# Patient Record
Sex: FEMALE | Race: BLACK OR AFRICAN AMERICAN | Employment: OTHER | URBAN - METROPOLITAN AREA
[De-identification: names, ages, dates, MRNs, and addresses within clinical notes are randomized per-mention and may not be internally consistent; named-entity substitution may affect disease eponyms.]

---

## 2019-09-05 PROBLEM — E66.9 OBESITY (BMI 30-39.9): Status: ACTIVE | Noted: 2019-09-05

## 2019-10-06 PROBLEM — D51.9 ANEMIA DUE TO VITAMIN B12 DEFICIENCY: Status: ACTIVE | Noted: 2019-10-06

## 2019-10-06 PROBLEM — I26.99 PULMONARY EMBOLISM ON RIGHT (HCC): Status: ACTIVE | Noted: 2019-10-06

## 2019-10-06 PROBLEM — D53.9 MACROCYTIC ANEMIA: Status: ACTIVE | Noted: 2019-10-06

## 2019-10-07 PROBLEM — Z00.00 MEDICARE ANNUAL WELLNESS VISIT, SUBSEQUENT: Status: ACTIVE | Noted: 2019-10-07

## 2019-10-07 PROBLEM — Z13.31 SCREENING FOR DEPRESSION: Status: ACTIVE | Noted: 2019-10-07

## 2019-10-07 PROBLEM — Z13.39 SCREENING FOR ALCOHOLISM: Status: ACTIVE | Noted: 2019-10-07

## 2019-10-07 PROBLEM — Z12.11 COLON CANCER SCREENING: Status: ACTIVE | Noted: 2019-10-07

## 2019-10-23 ENCOUNTER — HOSPITAL ENCOUNTER (OUTPATIENT)
Dept: LAB | Age: 74
Discharge: HOME | End: 2019-10-23
Payer: MEDICARE

## 2019-10-23 DIAGNOSIS — D68.59 THROMBOPHILIA (HCC): ICD-10-CM

## 2019-10-23 DIAGNOSIS — I26.99 PULMONARY EMBOLISM ON RIGHT (HCC): ICD-10-CM

## 2019-10-23 LAB
BASOPHILS # BLD: 0.1 K/UL (ref 0–0.2)
BASOPHILS NFR BLD: 1 % (ref 0–2)
D DIMER PPP FEU-MCNC: <0.27 UG/ML(FEU)
DIFFERENTIAL METHOD BLD: ABNORMAL
EOSINOPHIL # BLD: 0.5 K/UL (ref 0–0.8)
EOSINOPHIL NFR BLD: 6 % (ref 0.5–7.8)
ERYTHROCYTE [DISTWIDTH] IN BLOOD BY AUTOMATED COUNT: 14 % (ref 11.9–14.6)
FERRITIN SERPL-MCNC: 207 NG/ML (ref 8–388)
HCT VFR BLD AUTO: 38.9 % (ref 35.8–46.3)
HGB BLD-MCNC: 12.5 G/DL (ref 11.7–15.4)
HGB RETIC QN AUTO: 34 PG (ref 29–35)
IMM GRANULOCYTES # BLD AUTO: 0 K/UL (ref 0–0.5)
IMM GRANULOCYTES NFR BLD AUTO: 0 % (ref 0–5)
IMM RETICS NFR: 7.3 % (ref 3–15.9)
IRON SATN MFR SERPL: 19 %
IRON SERPL-MCNC: 47 UG/DL (ref 35–150)
LYMPHOCYTES # BLD: 3.3 K/UL (ref 0.5–4.6)
LYMPHOCYTES NFR BLD: 38 % (ref 13–44)
MCH RBC QN AUTO: 33.6 PG (ref 26.1–32.9)
MCHC RBC AUTO-ENTMCNC: 32.1 G/DL (ref 31.4–35)
MCV RBC AUTO: 104.6 FL (ref 79.6–97.8)
MONOCYTES # BLD: 0.5 K/UL (ref 0.1–1.3)
MONOCYTES NFR BLD: 6 % (ref 4–12)
NEUTS SEG # BLD: 4.4 K/UL (ref 1.7–8.2)
NEUTS SEG NFR BLD: 49 % (ref 43–78)
NRBC # BLD: 0 K/UL (ref 0–0.2)
PLATELET # BLD AUTO: 214 K/UL (ref 150–450)
PLATELET COMMENTS,PCOM: ADEQUATE
PMV BLD AUTO: 10.8 FL (ref 9.4–12.3)
RBC # BLD AUTO: 3.72 M/UL (ref 4.05–5.25)
RBC MORPH BLD: ABNORMAL
RBC MORPH BLD: ABNORMAL
RETICS # AUTO: 0.06 M/UL (ref 0.03–0.1)
RETICS/RBC NFR AUTO: 1.5 % (ref 0.3–2)
TIBC SERPL-MCNC: 249 UG/DL (ref 250–450)
WBC # BLD AUTO: 8.8 K/UL (ref 4.3–11.1)
WBC MORPH BLD: ABNORMAL

## 2019-10-23 PROCEDURE — 86146 BETA-2 GLYCOPROTEIN ANTIBODY: CPT

## 2019-10-23 PROCEDURE — 85025 COMPLETE CBC W/AUTO DIFF WBC: CPT

## 2019-10-23 PROCEDURE — 86147 CARDIOLIPIN ANTIBODY EA IG: CPT

## 2019-10-23 PROCEDURE — 83540 ASSAY OF IRON: CPT

## 2019-10-23 PROCEDURE — 82728 ASSAY OF FERRITIN: CPT

## 2019-10-23 PROCEDURE — 36415 COLL VENOUS BLD VENIPUNCTURE: CPT

## 2019-10-23 PROCEDURE — 85046 RETICYTE/HGB CONCENTRATE: CPT

## 2019-10-23 PROCEDURE — 85379 FIBRIN DEGRADATION QUANT: CPT

## 2019-10-24 LAB
B2 GLYCOPROT1 IGA SER-ACNC: <9 GPI IGA UNITS (ref 0–25)
B2 GLYCOPROT1 IGG SER-ACNC: <9 GPI IGG UNITS (ref 0–20)
B2 GLYCOPROT1 IGM SER-ACNC: <9 GPI IGM UNITS (ref 0–32)
CARDIOLIPIN IGA SER IA-ACNC: <9 APL U/ML (ref 0–11)
CARDIOLIPIN IGG SER IA-ACNC: <9 GPL U/ML (ref 0–14)
CARDIOLIPIN IGM SER IA-ACNC: <9 MPL U/ML (ref 0–12)

## 2019-10-30 LAB — PROTHROMBIN G2021A MUTATION, XMPTMT: NORMAL

## 2019-11-06 PROBLEM — Z12.11 COLON CANCER SCREENING: Status: RESOLVED | Noted: 2019-10-07 | Resolved: 2019-11-06

## 2019-11-06 PROBLEM — Z00.00 MEDICARE ANNUAL WELLNESS VISIT, SUBSEQUENT: Status: RESOLVED | Noted: 2019-10-07 | Resolved: 2019-11-06

## 2019-11-26 ENCOUNTER — HOSPITAL ENCOUNTER (OUTPATIENT)
Dept: ULTRASOUND IMAGING | Age: 74
Discharge: HOME | End: 2019-11-26
Attending: INTERNAL MEDICINE

## 2019-11-26 DIAGNOSIS — D68.59 THROMBOPHILIA (HCC): ICD-10-CM

## 2019-12-04 ENCOUNTER — HOSPITAL ENCOUNTER (OUTPATIENT)
Dept: LAB | Age: 74
Discharge: HOME | End: 2019-12-04
Payer: MEDICARE

## 2019-12-04 DIAGNOSIS — D68.59 THROMBOPHILIA (HCC): ICD-10-CM

## 2019-12-04 LAB
ALBUMIN SERPL-MCNC: 3.2 G/DL (ref 3.2–4.6)
ALBUMIN/GLOB SERPL: 0.7 {RATIO} (ref 1.2–3.5)
ALP SERPL-CCNC: 136 U/L (ref 50–136)
ALT SERPL-CCNC: 27 U/L (ref 12–65)
ANION GAP SERPL CALC-SCNC: 6 MMOL/L (ref 7–16)
AST SERPL-CCNC: 14 U/L (ref 15–37)
BASOPHILS # BLD: 0 K/UL (ref 0–0.2)
BASOPHILS NFR BLD: 0 % (ref 0–2)
BILIRUB SERPL-MCNC: 0.2 MG/DL (ref 0.2–1.1)
BUN SERPL-MCNC: 14 MG/DL (ref 8–23)
CALCIUM SERPL-MCNC: 8.7 MG/DL (ref 8.3–10.4)
CHLORIDE SERPL-SCNC: 109 MMOL/L (ref 98–107)
CO2 SERPL-SCNC: 28 MMOL/L (ref 21–32)
CREAT SERPL-MCNC: 0.88 MG/DL (ref 0.6–1)
D DIMER PPP FEU-MCNC: 0.49 UG/ML(FEU)
DIFFERENTIAL METHOD BLD: ABNORMAL
EOSINOPHIL # BLD: 0.3 K/UL (ref 0–0.8)
EOSINOPHIL NFR BLD: 4 % (ref 0.5–7.8)
ERYTHROCYTE [DISTWIDTH] IN BLOOD BY AUTOMATED COUNT: 13.2 % (ref 11.9–14.6)
GLOBULIN SER CALC-MCNC: 4.3 G/DL (ref 2.3–3.5)
GLUCOSE SERPL-MCNC: 82 MG/DL (ref 65–100)
HCT VFR BLD AUTO: 38.5 % (ref 35.8–46.3)
HGB BLD-MCNC: 12.3 G/DL (ref 11.7–15.4)
IMM GRANULOCYTES # BLD AUTO: 0 K/UL (ref 0–0.5)
IMM GRANULOCYTES NFR BLD AUTO: 0 % (ref 0–5)
LYMPHOCYTES # BLD: 3.2 K/UL (ref 0.5–4.6)
LYMPHOCYTES NFR BLD: 45 % (ref 13–44)
MCH RBC QN AUTO: 30.4 PG (ref 26.1–32.9)
MCHC RBC AUTO-ENTMCNC: 31.9 G/DL (ref 31.4–35)
MCV RBC AUTO: 95.3 FL (ref 79.6–97.8)
MONOCYTES # BLD: 0.5 K/UL (ref 0.1–1.3)
MONOCYTES NFR BLD: 7 % (ref 4–12)
NEUTS SEG # BLD: 3.1 K/UL (ref 1.7–8.2)
NEUTS SEG NFR BLD: 44 % (ref 43–78)
NRBC # BLD: 0 K/UL (ref 0–0.2)
PLATELET # BLD AUTO: 230 K/UL (ref 150–450)
PMV BLD AUTO: 10.2 FL (ref 9.4–12.3)
POTASSIUM SERPL-SCNC: 3.6 MMOL/L (ref 3.5–5.1)
PROT SERPL-MCNC: 7.5 G/DL (ref 6.3–8.2)
RBC # BLD AUTO: 4.04 M/UL (ref 4.05–5.25)
SODIUM SERPL-SCNC: 143 MMOL/L (ref 136–145)
WBC # BLD AUTO: 7.1 K/UL (ref 4.3–11.1)

## 2019-12-04 PROCEDURE — 85025 COMPLETE CBC W/AUTO DIFF WBC: CPT

## 2019-12-04 PROCEDURE — 36415 COLL VENOUS BLD VENIPUNCTURE: CPT

## 2019-12-04 PROCEDURE — 85306 CLOT INHIBIT PROT S FREE: CPT

## 2019-12-04 PROCEDURE — 85300 ANTITHROMBIN III ACTIVITY: CPT

## 2019-12-04 PROCEDURE — 85303 CLOT INHIBIT PROT C ACTIVITY: CPT

## 2019-12-04 PROCEDURE — 85301 ANTITHROMBIN III ANTIGEN: CPT

## 2019-12-04 PROCEDURE — 80053 COMPREHEN METABOLIC PANEL: CPT

## 2019-12-04 PROCEDURE — 85610 PROTHROMBIN TIME: CPT

## 2019-12-04 PROCEDURE — 85379 FIBRIN DEGRADATION QUANT: CPT

## 2019-12-05 LAB
AT III ACT/NOR PPP CHRO: NORMAL %
AT III AG ACT/NOR PPP IA: NORMAL %
PROT C ACT/NOR PPP CHRO: NORMAL %

## 2019-12-06 LAB — LUPUS ANTICOAG PANEL, XMLUPT: NORMAL

## 2019-12-07 LAB
FACTOR V LEIDEN MUTATION, XMF5LT: NORMAL %
PROT S ACT/NOR PPP CHRO: NORMAL %

## 2020-06-08 ENCOUNTER — HOSPITAL ENCOUNTER (OUTPATIENT)
Dept: LAB | Age: 75
Discharge: HOME | End: 2020-06-08
Payer: MEDICARE

## 2020-06-08 DIAGNOSIS — D68.59 THROMBOPHILIA (HCC): ICD-10-CM

## 2020-06-08 LAB
ALBUMIN SERPL-MCNC: 3.5 G/DL (ref 3.2–4.6)
ALBUMIN/GLOB SERPL: 0.8 {RATIO} (ref 1.2–3.5)
ALP SERPL-CCNC: 165 U/L (ref 50–136)
ALT SERPL-CCNC: 32 U/L (ref 12–65)
ANION GAP SERPL CALC-SCNC: 4 MMOL/L (ref 7–16)
AST SERPL-CCNC: 21 U/L (ref 15–37)
BASOPHILS # BLD: 0 K/UL (ref 0–0.2)
BASOPHILS NFR BLD: 0 % (ref 0–2)
BILIRUB SERPL-MCNC: 0.4 MG/DL (ref 0.2–1.1)
BUN SERPL-MCNC: 14 MG/DL (ref 8–23)
CALCIUM SERPL-MCNC: 8.5 MG/DL (ref 8.3–10.4)
CHLORIDE SERPL-SCNC: 110 MMOL/L (ref 98–107)
CO2 SERPL-SCNC: 26 MMOL/L (ref 21–32)
CREAT SERPL-MCNC: 1 MG/DL (ref 0.6–1)
D DIMER PPP FEU-MCNC: <0.27 UG/ML(FEU)
DIFFERENTIAL METHOD BLD: NORMAL
EOSINOPHIL # BLD: 0.3 K/UL (ref 0–0.8)
EOSINOPHIL NFR BLD: 4 % (ref 0.5–7.8)
ERYTHROCYTE [DISTWIDTH] IN BLOOD BY AUTOMATED COUNT: 13.5 % (ref 11.9–14.6)
GLOBULIN SER CALC-MCNC: 4.3 G/DL (ref 2.3–3.5)
GLUCOSE SERPL-MCNC: 112 MG/DL (ref 65–100)
HCT VFR BLD AUTO: 37.9 % (ref 35.8–46.3)
HGB BLD-MCNC: 12.8 G/DL (ref 11.7–15.4)
IMM GRANULOCYTES # BLD AUTO: 0 K/UL (ref 0–0.5)
IMM GRANULOCYTES NFR BLD AUTO: 0 % (ref 0–5)
LYMPHOCYTES # BLD: 3 K/UL (ref 0.5–4.6)
LYMPHOCYTES NFR BLD: 35 % (ref 13–44)
MCH RBC QN AUTO: 29.2 PG (ref 26.1–32.9)
MCHC RBC AUTO-ENTMCNC: 33.8 G/DL (ref 31.4–35)
MCV RBC AUTO: 86.5 FL (ref 79.6–97.8)
MONOCYTES # BLD: 0.7 K/UL (ref 0.1–1.3)
MONOCYTES NFR BLD: 8 % (ref 4–12)
NEUTS SEG # BLD: 4.5 K/UL (ref 1.7–8.2)
NEUTS SEG NFR BLD: 53 % (ref 43–78)
NRBC # BLD: 0 K/UL (ref 0–0.2)
PLATELET # BLD AUTO: 222 K/UL (ref 150–450)
PMV BLD AUTO: 10 FL (ref 9.4–12.3)
POTASSIUM SERPL-SCNC: 3.7 MMOL/L (ref 3.5–5.1)
PROT SERPL-MCNC: 7.8 G/DL (ref 6.3–8.2)
RBC # BLD AUTO: 4.38 M/UL (ref 4.05–5.25)
SODIUM SERPL-SCNC: 140 MMOL/L (ref 136–145)
WBC # BLD AUTO: 8.5 K/UL (ref 4.3–11.1)

## 2020-06-08 PROCEDURE — 85025 COMPLETE CBC W/AUTO DIFF WBC: CPT

## 2020-06-08 PROCEDURE — 85379 FIBRIN DEGRADATION QUANT: CPT

## 2020-06-08 PROCEDURE — 80053 COMPREHEN METABOLIC PANEL: CPT

## 2020-06-08 PROCEDURE — 36415 COLL VENOUS BLD VENIPUNCTURE: CPT

## 2020-08-28 PROBLEM — R21 RASH AND NONSPECIFIC SKIN ERUPTION: Status: ACTIVE | Noted: 2020-08-28

## 2020-08-28 PROBLEM — E66.01 SEVERE OBESITY (HCC): Status: ACTIVE | Noted: 2020-08-28

## 2021-03-02 PROBLEM — Z23 ENCOUNTER FOR IMMUNIZATION: Status: ACTIVE | Noted: 2021-03-02

## 2021-03-23 ENCOUNTER — HOSPITAL ENCOUNTER (OUTPATIENT)
Dept: LAB | Age: 76
Discharge: HOME | End: 2021-03-23
Payer: MEDICARE

## 2021-03-23 DIAGNOSIS — I26.99 PULMONARY EMBOLISM ON RIGHT (HCC): ICD-10-CM

## 2021-03-23 LAB
ALBUMIN SERPL-MCNC: 3.7 G/DL (ref 3.2–4.6)
ALBUMIN/GLOB SERPL: 0.9 {RATIO} (ref 1.2–3.5)
ALP SERPL-CCNC: 150 U/L (ref 50–136)
ALT SERPL-CCNC: 26 U/L (ref 12–65)
ANION GAP SERPL CALC-SCNC: 4 MMOL/L (ref 7–16)
AST SERPL-CCNC: 14 U/L (ref 15–37)
BASOPHILS # BLD: 0 K/UL (ref 0–0.2)
BASOPHILS NFR BLD: 1 % (ref 0–2)
BILIRUB SERPL-MCNC: 0.4 MG/DL (ref 0.2–1.1)
BUN SERPL-MCNC: 20 MG/DL (ref 8–23)
CALCIUM SERPL-MCNC: 9.2 MG/DL (ref 8.3–10.4)
CHLORIDE SERPL-SCNC: 108 MMOL/L (ref 98–107)
CO2 SERPL-SCNC: 29 MMOL/L (ref 21–32)
CREAT SERPL-MCNC: 0.9 MG/DL (ref 0.6–1)
D DIMER PPP FEU-MCNC: <0.27 UG/ML(FEU)
DIFFERENTIAL METHOD BLD: NORMAL
EOSINOPHIL # BLD: 0.3 K/UL (ref 0–0.8)
EOSINOPHIL NFR BLD: 4 % (ref 0.5–7.8)
ERYTHROCYTE [DISTWIDTH] IN BLOOD BY AUTOMATED COUNT: 14.1 % (ref 11.9–14.6)
GLOBULIN SER CALC-MCNC: 4.3 G/DL (ref 2.3–3.5)
GLUCOSE SERPL-MCNC: 91 MG/DL (ref 65–100)
HCT VFR BLD AUTO: 40.8 % (ref 35.8–46.3)
HGB BLD-MCNC: 13.1 G/DL (ref 11.7–15.4)
IMM GRANULOCYTES # BLD AUTO: 0 K/UL (ref 0–0.5)
IMM GRANULOCYTES NFR BLD AUTO: 0 % (ref 0–5)
LYMPHOCYTES # BLD: 3.5 K/UL (ref 0.5–4.6)
LYMPHOCYTES NFR BLD: 42 % (ref 13–44)
MCH RBC QN AUTO: 28.5 PG (ref 26.1–32.9)
MCHC RBC AUTO-ENTMCNC: 32.1 G/DL (ref 31.4–35)
MCV RBC AUTO: 88.9 FL (ref 79.6–97.8)
MONOCYTES # BLD: 0.6 K/UL (ref 0.1–1.3)
MONOCYTES NFR BLD: 8 % (ref 4–12)
NEUTS SEG # BLD: 3.7 K/UL (ref 1.7–8.2)
NEUTS SEG NFR BLD: 45 % (ref 43–78)
NRBC # BLD: 0 K/UL (ref 0–0.2)
PLATELET # BLD AUTO: 237 K/UL (ref 150–450)
PMV BLD AUTO: 10 FL (ref 9.4–12.3)
POTASSIUM SERPL-SCNC: 4.8 MMOL/L (ref 3.5–5.1)
PROT SERPL-MCNC: 8 G/DL (ref 6.3–8.2)
RBC # BLD AUTO: 4.59 M/UL (ref 4.05–5.2)
SODIUM SERPL-SCNC: 141 MMOL/L (ref 136–145)
WBC # BLD AUTO: 8.2 K/UL (ref 4.3–11.1)

## 2021-03-23 PROCEDURE — 80053 COMPREHEN METABOLIC PANEL: CPT

## 2021-03-23 PROCEDURE — 85379 FIBRIN DEGRADATION QUANT: CPT

## 2021-03-23 PROCEDURE — 85025 COMPLETE CBC W/AUTO DIFF WBC: CPT

## 2021-03-23 PROCEDURE — 36415 COLL VENOUS BLD VENIPUNCTURE: CPT

## 2021-04-01 PROBLEM — Z00.00 MEDICARE ANNUAL WELLNESS VISIT, SUBSEQUENT: Status: RESOLVED | Noted: 2019-10-07 | Resolved: 2021-04-01

## 2021-04-01 PROBLEM — Z23 ENCOUNTER FOR IMMUNIZATION: Status: RESOLVED | Noted: 2021-03-02 | Resolved: 2021-04-01

## 2021-09-02 PROBLEM — D68.59 THROMBOPHILIA (HCC): Status: ACTIVE | Noted: 2021-09-02

## 2021-09-29 ENCOUNTER — HOSPITAL ENCOUNTER (OUTPATIENT)
Dept: LAB | Age: 76
Discharge: HOME | End: 2021-09-29
Payer: MEDICARE

## 2021-09-29 DIAGNOSIS — D68.59 THROMBOPHILIA (HCC): ICD-10-CM

## 2021-09-29 LAB
ALBUMIN SERPL-MCNC: 3.4 G/DL (ref 3.2–4.6)
ALBUMIN/GLOB SERPL: 0.8 {RATIO} (ref 1.2–3.5)
ALP SERPL-CCNC: 163 U/L (ref 50–136)
ALT SERPL-CCNC: 25 U/L (ref 12–65)
ANION GAP SERPL CALC-SCNC: 6 MMOL/L (ref 7–16)
AST SERPL-CCNC: 14 U/L (ref 15–37)
BASOPHILS # BLD: 0 K/UL (ref 0–0.2)
BASOPHILS NFR BLD: 1 % (ref 0–2)
BILIRUB SERPL-MCNC: 0.5 MG/DL (ref 0.2–1.1)
BUN SERPL-MCNC: 19 MG/DL (ref 8–23)
CALCIUM SERPL-MCNC: 9.5 MG/DL (ref 8.3–10.4)
CHLORIDE SERPL-SCNC: 110 MMOL/L (ref 98–107)
CO2 SERPL-SCNC: 24 MMOL/L (ref 21–32)
CREAT SERPL-MCNC: 0.97 MG/DL (ref 0.6–1)
D DIMER PPP FEU-MCNC: <0.27 UG/ML(FEU)
DIFFERENTIAL METHOD BLD: NORMAL
EOSINOPHIL # BLD: 0.3 K/UL (ref 0–0.8)
EOSINOPHIL NFR BLD: 4 % (ref 0.5–7.8)
ERYTHROCYTE [DISTWIDTH] IN BLOOD BY AUTOMATED COUNT: 14.6 % (ref 11.9–14.6)
GLOBULIN SER CALC-MCNC: 4.5 G/DL (ref 2.3–3.5)
GLUCOSE SERPL-MCNC: 131 MG/DL (ref 65–100)
HCT VFR BLD AUTO: 41.5 % (ref 35.8–46.3)
HGB BLD-MCNC: 13.6 G/DL (ref 11.7–15.4)
IMM GRANULOCYTES # BLD AUTO: 0 K/UL (ref 0–0.5)
IMM GRANULOCYTES NFR BLD AUTO: 0 % (ref 0–5)
LYMPHOCYTES # BLD: 2.5 K/UL (ref 0.5–4.6)
LYMPHOCYTES NFR BLD: 33 % (ref 13–44)
MCH RBC QN AUTO: 29.1 PG (ref 26.1–32.9)
MCHC RBC AUTO-ENTMCNC: 32.8 G/DL (ref 31.4–35)
MCV RBC AUTO: 88.7 FL (ref 79.6–97.8)
MONOCYTES # BLD: 0.8 K/UL (ref 0.1–1.3)
MONOCYTES NFR BLD: 10 % (ref 4–12)
NEUTS SEG # BLD: 3.9 K/UL (ref 1.7–8.2)
NEUTS SEG NFR BLD: 52 % (ref 43–78)
NRBC # BLD: 0 K/UL (ref 0–0.2)
PLATELET # BLD AUTO: 248 K/UL (ref 150–450)
PMV BLD AUTO: 10.5 FL (ref 9.4–12.3)
POTASSIUM SERPL-SCNC: 3.9 MMOL/L (ref 3.5–5.1)
PROT SERPL-MCNC: 7.9 G/DL (ref 6.3–8.2)
RBC # BLD AUTO: 4.68 M/UL (ref 4.05–5.2)
SODIUM SERPL-SCNC: 140 MMOL/L (ref 136–145)
WBC # BLD AUTO: 7.6 K/UL (ref 4.3–11.1)

## 2021-09-29 PROCEDURE — 36415 COLL VENOUS BLD VENIPUNCTURE: CPT

## 2021-09-29 PROCEDURE — 80053 COMPREHEN METABOLIC PANEL: CPT

## 2021-09-29 PROCEDURE — 85379 FIBRIN DEGRADATION QUANT: CPT

## 2021-09-29 PROCEDURE — 85025 COMPLETE CBC W/AUTO DIFF WBC: CPT

## 2022-01-02 PROBLEM — H93.A3 PULSATILE TINNITUS OF BOTH EARS: Status: ACTIVE | Noted: 2022-01-02

## 2022-03-18 PROBLEM — E66.01 SEVERE OBESITY (HCC): Status: ACTIVE | Noted: 2020-08-28

## 2022-03-19 PROBLEM — Z13.31 SCREENING FOR DEPRESSION: Status: ACTIVE | Noted: 2019-10-07

## 2022-03-19 PROBLEM — D68.59 THROMBOPHILIA (HCC): Status: ACTIVE | Noted: 2021-09-02

## 2022-03-19 PROBLEM — D53.9 MACROCYTIC ANEMIA: Status: ACTIVE | Noted: 2019-10-06

## 2022-03-19 PROBLEM — Z13.39 SCREENING FOR ALCOHOLISM: Status: ACTIVE | Noted: 2019-10-07

## 2022-03-19 PROBLEM — H93.A3 PULSATILE TINNITUS OF BOTH EARS: Status: ACTIVE | Noted: 2022-01-02

## 2022-03-19 PROBLEM — I26.99 PULMONARY EMBOLISM ON RIGHT (HCC): Status: ACTIVE | Noted: 2019-10-06

## 2022-03-19 PROBLEM — R21 RASH AND NONSPECIFIC SKIN ERUPTION: Status: ACTIVE | Noted: 2020-08-28

## 2022-03-19 PROBLEM — D51.9 ANEMIA DUE TO VITAMIN B12 DEFICIENCY: Status: ACTIVE | Noted: 2019-10-06

## 2022-03-20 PROBLEM — E66.9 OBESITY (BMI 30-39.9): Status: ACTIVE | Noted: 2019-09-05

## 2022-03-30 ENCOUNTER — HOSPITAL ENCOUNTER (OUTPATIENT)
Dept: LAB | Age: 77
Discharge: HOME | End: 2022-03-30
Payer: MEDICARE

## 2022-03-30 DIAGNOSIS — D68.59 THROMBOPHILIA (HCC): ICD-10-CM

## 2022-03-30 LAB
ALBUMIN SERPL-MCNC: 3.5 G/DL (ref 3.2–4.6)
ALBUMIN/GLOB SERPL: 0.8 {RATIO} (ref 1.2–3.5)
ALP SERPL-CCNC: 165 U/L (ref 50–136)
ALT SERPL-CCNC: 22 U/L (ref 12–65)
ANION GAP SERPL CALC-SCNC: 3 MMOL/L (ref 7–16)
AST SERPL-CCNC: 13 U/L (ref 15–37)
BASOPHILS # BLD: 0 K/UL (ref 0–0.2)
BASOPHILS NFR BLD: 0 % (ref 0–2)
BILIRUB SERPL-MCNC: 0.5 MG/DL (ref 0.2–1.1)
BUN SERPL-MCNC: 21 MG/DL (ref 8–23)
CALCIUM SERPL-MCNC: 8.7 MG/DL (ref 8.3–10.4)
CHLORIDE SERPL-SCNC: 112 MMOL/L (ref 98–107)
CO2 SERPL-SCNC: 26 MMOL/L (ref 21–32)
CREAT SERPL-MCNC: 1 MG/DL (ref 0.6–1)
D DIMER PPP FEU-MCNC: <0.27 UG/ML(FEU)
DIFFERENTIAL METHOD BLD: NORMAL
EOSINOPHIL # BLD: 0.5 K/UL (ref 0–0.8)
EOSINOPHIL NFR BLD: 6 % (ref 0.5–7.8)
ERYTHROCYTE [DISTWIDTH] IN BLOOD BY AUTOMATED COUNT: 14.4 % (ref 11.9–14.6)
GLOBULIN SER CALC-MCNC: 4.4 G/DL (ref 2.3–3.5)
GLUCOSE SERPL-MCNC: 114 MG/DL (ref 65–100)
HCT VFR BLD AUTO: 38.7 % (ref 35.8–46.3)
HGB BLD-MCNC: 12.7 G/DL (ref 11.7–15.4)
IMM GRANULOCYTES # BLD AUTO: 0 K/UL (ref 0–0.5)
IMM GRANULOCYTES NFR BLD AUTO: 0 % (ref 0–5)
LYMPHOCYTES # BLD: 2.6 K/UL (ref 0.5–4.6)
LYMPHOCYTES NFR BLD: 35 % (ref 13–44)
MCH RBC QN AUTO: 29.6 PG (ref 26.1–32.9)
MCHC RBC AUTO-ENTMCNC: 32.8 G/DL (ref 31.4–35)
MCV RBC AUTO: 90.2 FL (ref 79.6–97.8)
MONOCYTES # BLD: 0.5 K/UL (ref 0.1–1.3)
MONOCYTES NFR BLD: 7 % (ref 4–12)
NEUTS SEG # BLD: 4 K/UL (ref 1.7–8.2)
NEUTS SEG NFR BLD: 52 % (ref 43–78)
NRBC # BLD: 0 K/UL (ref 0–0.2)
PLATELET # BLD AUTO: 207 K/UL (ref 150–450)
PMV BLD AUTO: 10.3 FL (ref 9.4–12.3)
POTASSIUM SERPL-SCNC: 3.8 MMOL/L (ref 3.5–5.1)
PROT SERPL-MCNC: 7.9 G/DL (ref 6.3–8.2)
RBC # BLD AUTO: 4.29 M/UL (ref 4.05–5.2)
SODIUM SERPL-SCNC: 141 MMOL/L (ref 136–145)
WBC # BLD AUTO: 7.6 K/UL (ref 4.3–11.1)

## 2022-03-30 PROCEDURE — 85025 COMPLETE CBC W/AUTO DIFF WBC: CPT

## 2022-03-30 PROCEDURE — 80053 COMPREHEN METABOLIC PANEL: CPT

## 2022-03-30 PROCEDURE — 36415 COLL VENOUS BLD VENIPUNCTURE: CPT

## 2022-03-30 PROCEDURE — 85379 FIBRIN DEGRADATION QUANT: CPT

## 2022-07-25 ENCOUNTER — OFFICE VISIT (OUTPATIENT)
Dept: CARDIOLOGY CLINIC | Age: 77
End: 2022-07-25
Payer: MEDICAID

## 2022-07-25 VITALS
BODY MASS INDEX: 31.45 KG/M2 | DIASTOLIC BLOOD PRESSURE: 68 MMHG | HEART RATE: 60 BPM | WEIGHT: 156 LBS | SYSTOLIC BLOOD PRESSURE: 130 MMHG | HEIGHT: 59 IN

## 2022-07-25 DIAGNOSIS — I26.99 PULMONARY EMBOLISM ON RIGHT (HCC): ICD-10-CM

## 2022-07-25 DIAGNOSIS — I10 ESSENTIAL HYPERTENSION: Primary | ICD-10-CM

## 2022-07-25 DIAGNOSIS — Z79.01 CHRONIC ANTICOAGULATION: ICD-10-CM

## 2022-07-25 PROCEDURE — 99212 OFFICE O/P EST SF 10 MIN: CPT | Performed by: INTERNAL MEDICINE

## 2022-07-25 PROCEDURE — 1123F ACP DISCUSS/DSCN MKR DOCD: CPT | Performed by: INTERNAL MEDICINE

## 2022-07-25 ASSESSMENT — ENCOUNTER SYMPTOMS: SHORTNESS OF BREATH: 0

## 2022-07-25 NOTE — PROGRESS NOTES
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 female seen for a follow up visit regarding the following:     Chief Complaint   Patient presents with    Follow-up Chronic Condition    Hypertension            HPI:  Follow up  Follow-up Chronic Condition and Hypertension   . Follow up hypertension. Feels great, tinnitus resolved with BP control, tolerating anticoagulant. Juancarlos Elmore PAST CARDIAC HISTORY:   2019- RLL pulmonary embolus- unprovoked - anticoagulated   Dec 2021- < 50% bilateral carotid artery stenosis          Past Medical History, Past Surgical History, Family history, Social History, and Medications were all reviewed with the patient today and updated as necessary. Prior to Admission medications    Medication Sig Start Date End Date Taking?  Authorizing Provider   Multiple Vitamin (MULTIVITAMIN ADULT PO) Take 1 tablet by mouth daily   Yes Historical Provider, MD   rivaroxaban (XARELTO) 20 MG TABS tablet Take 1 tablet by mouth daily (with breakfast) 22  Yes Gary Lizarraga MD   atorvastatin (LIPITOR) 20 MG tablet Take 20 mg by mouth daily 22  Yes Ar Automatic Reconciliation   carvedilol (COREG) 25 MG tablet Take 25 mg by mouth 2 times daily (with meals) 22  Yes Ar Automatic Reconciliation   hydrOXYzine (ATARAX) 25 MG tablet TAKE 1 TAB BY MOUTH THREE (3) TIMES DAILY AS NEEDED FOR ITCHING 22  Yes Ar Automatic Reconciliation   valsartan (DIOVAN) 320 MG tablet Take 320 mg by mouth daily 21  Yes Ar Automatic Reconciliation   cyanocobalamin 500 MCG tablet Take 500 mcg by mouth daily  Patient not taking: Reported on 2022    Ar Automatic Reconciliation     Allergies   Allergen Reactions    Codeine Swelling     Swelling of face and lips     Past Medical History:   Diagnosis Date    AR (allergic rhinitis)     Elevated LDL cholesterol level     H/O iron deficiency anemia     Hypertension     well controlled    Mild hyperlipidemia     Pulmonary emboli (HCC)     Pulmonary embolus (HCC)      Past Surgical History:   Procedure Laterality Date    CHOLECYSTECTOMY       Family History   Problem Relation Age of Onset    Stroke Father 48     Social History     Tobacco Use    Smoking status: Never    Smokeless tobacco: Never   Substance Use Topics    Alcohol use: No     Alcohol/week: 0.0 standard drinks       ROS:    Review of Systems   Cardiovascular:  Negative for chest pain. Respiratory:  Negative for shortness of breath. PHYSICAL EXAM:   /68   Pulse 60   Ht 4' 11\" (1.499 m)   Wt 156 lb (70.8 kg)   BMI 31.51 kg/m²      Wt Readings from Last 3 Encounters:   07/25/22 156 lb (70.8 kg)   03/30/22 157 lb 12.8 oz (71.6 kg)   02/22/22 159 lb (72.1 kg)     BP Readings from Last 3 Encounters:   07/25/22 130/68   03/30/22 132/71   02/22/22 122/80     Pulse Readings from Last 3 Encounters:   07/25/22 60   03/30/22 66   02/22/22 67           Physical Exam  Constitutional:       Appearance: Normal appearance. HENT:      Head: Normocephalic and atraumatic. Eyes:      Conjunctiva/sclera: Conjunctivae normal.   Neck:      Vascular: No carotid bruit. Cardiovascular:      Rate and Rhythm: Normal rate and regular rhythm. Heart sounds: No murmur heard. No friction rub. No gallop. Pulmonary:      Effort: No respiratory distress. Breath sounds: No wheezing or rales. Musculoskeletal:         General: No swelling. Cervical back: Neck supple. Skin:     General: Skin is warm and dry. Neurological:      General: No focal deficit present. Mental Status: She is alert. Psychiatric:         Mood and Affect: Mood normal.         Behavior: Behavior normal.       Medical problems and test results were reviewed with the patient today.      DATA REVIEW    LIPID PANEL     Lab Results Component Value Date    CHOL 110 02/25/2022    CHOL 144 03/02/2021    CHOL 132 08/28/2020     Lab Results   Component Value Date    TRIG 65 02/25/2022    TRIG 74 03/02/2021    TRIG 74 08/28/2020     Lab Results   Component Value Date    HDL 41 02/25/2022    HDL 51 03/02/2021    HDL 53 08/28/2020     Lab Results   Component Value Date    LDLCALC 55 02/25/2022    LDLCALC 78 03/02/2021    LDLCALC 64 08/28/2020     Lab Results   Component Value Date    LABVLDL 15 08/28/2020    LABVLDL 20 09/05/2019    VLDL 14 02/25/2022    VLDL 15 03/02/2021     No results found for: CHOLHDLRATIO    BMP  Lab Results   Component Value Date/Time     03/30/2022 11:05 AM    K 3.8 03/30/2022 11:05 AM     03/30/2022 11:05 AM    CO2 26 03/30/2022 11:05 AM    BUN 21 03/30/2022 11:05 AM    CREATININE 1.00 03/30/2022 11:05 AM    GLUCOSE 114 03/30/2022 11:05 AM    CALCIUM 8.7 03/30/2022 11:05 AM      Lab Results   Component Value Date    WBC 7.6 03/30/2022    HGB 12.7 03/30/2022    HCT 38.7 03/30/2022    MCV 90.2 03/30/2022     03/30/2022       EKG        CXR/IMAGING        DEVICE INTERROGATION        OUTSIDE RECORDS REVIEW    Records from outside providers have been reviewed and summarized as noted in the HPI, past history and data review sections of this note, and reviewed with patient. .       ASSESSMENT and PLAN    Marzella Kocher was seen today for follow-up chronic condition and hypertension. Diagnoses and all orders for this visit:    Essential hypertension    Pulmonary embolism on right (HCC)    Chronic anticoagulation        IMPRESSION:    BP at target, continue meds      Tinnitus resolved        Return if symptoms worsen or fail to improve. Thank you for allowing me to participate in this patient's care. Please call or contact me if there are any questions or concerns regarding the above.       Rah Pate MD  07/25/22  12:04 PM

## 2022-08-22 ENCOUNTER — OFFICE VISIT (OUTPATIENT)
Dept: FAMILY MEDICINE CLINIC | Facility: CLINIC | Age: 77
End: 2022-08-22
Payer: MEDICAID

## 2022-08-22 VITALS
HEIGHT: 58 IN | OXYGEN SATURATION: 97 % | DIASTOLIC BLOOD PRESSURE: 74 MMHG | HEART RATE: 73 BPM | BODY MASS INDEX: 32.95 KG/M2 | SYSTOLIC BLOOD PRESSURE: 130 MMHG | TEMPERATURE: 97.1 F | RESPIRATION RATE: 16 BRPM | WEIGHT: 157 LBS

## 2022-08-22 DIAGNOSIS — I10 ESSENTIAL HYPERTENSION: ICD-10-CM

## 2022-08-22 DIAGNOSIS — E78.2 MIXED HYPERLIPIDEMIA: Primary | ICD-10-CM

## 2022-08-22 DIAGNOSIS — E66.9 OBESITY (BMI 30-39.9): ICD-10-CM

## 2022-08-22 PROBLEM — N18.30 CHRONIC RENAL DISEASE, STAGE III (HCC): Status: ACTIVE | Noted: 2022-08-22

## 2022-08-22 PROCEDURE — 99214 OFFICE O/P EST MOD 30 MIN: CPT | Performed by: FAMILY MEDICINE

## 2022-08-22 PROCEDURE — 1123F ACP DISCUSS/DSCN MKR DOCD: CPT | Performed by: FAMILY MEDICINE

## 2022-08-22 RX ORDER — CARVEDILOL 25 MG/1
25 TABLET ORAL 2 TIMES DAILY WITH MEALS
Qty: 180 TABLET | Refills: 1 | Status: SHIPPED | OUTPATIENT
Start: 2022-08-22

## 2022-08-22 RX ORDER — HYDROXYZINE HYDROCHLORIDE 25 MG/1
25 TABLET, FILM COATED ORAL EVERY 6 HOURS PRN
Qty: 90 TABLET | Refills: 1 | Status: CANCELLED | OUTPATIENT
Start: 2022-08-22

## 2022-08-22 RX ORDER — VALSARTAN 320 MG/1
320 TABLET ORAL DAILY
Qty: 90 TABLET | Refills: 1 | Status: SHIPPED | OUTPATIENT
Start: 2022-08-22

## 2022-08-22 RX ORDER — ATORVASTATIN CALCIUM 20 MG/1
20 TABLET, FILM COATED ORAL DAILY
Qty: 90 TABLET | Refills: 1 | Status: SHIPPED | OUTPATIENT
Start: 2022-08-22

## 2022-08-22 ASSESSMENT — ENCOUNTER SYMPTOMS
COUGH: 0
ABDOMINAL PAIN: 0
WHEEZING: 0
VOMITING: 0
PHOTOPHOBIA: 0
SINUS PAIN: 0
NAUSEA: 0
SHORTNESS OF BREATH: 0
DIARRHEA: 0

## 2022-08-22 ASSESSMENT — PATIENT HEALTH QUESTIONNAIRE - PHQ9
SUM OF ALL RESPONSES TO PHQ QUESTIONS 1-9: 0
1. LITTLE INTEREST OR PLEASURE IN DOING THINGS: 0
SUM OF ALL RESPONSES TO PHQ9 QUESTIONS 1 & 2: 0
2. FEELING DOWN, DEPRESSED OR HOPELESS: 0
SUM OF ALL RESPONSES TO PHQ QUESTIONS 1-9: 0

## 2022-08-22 NOTE — PROGRESS NOTES
Lino Chavez (:  1945) is a 69 y.o. female,Established patient, here for evaluation of the following chief complaint(s):  Medication Refill and Labs Only         ASSESSMENT/PLAN:  1. Mixed hyperlipidemia  -     atorvastatin (LIPITOR) 20 MG tablet; Take 1 tablet by mouth daily, Disp-90 tablet, R-1Normal  -     Lipid Panel; Future  2. Essential hypertension  -     carvedilol (COREG) 25 MG tablet; Take 1 tablet by mouth 2 times daily (with meals), Disp-180 tablet, R-1Normal  -     valsartan (DIOVAN) 320 MG tablet; Take 1 tablet by mouth daily, Disp-90 tablet, R-1Normal  3. Obesity (BMI 30-39.9)  4. BMI 32.0-32.9,adult    Await labs, Dr Zelalem Olivares does her CBC and CMP every 6 months, to continue to avoid salt and fatty foods, advised to schedule an eye exam.  Advised patient to continue to lose weight. Also advised to exercise regularly, to eat healthy foods, and to control portion sizes. Return for thursday- Fasting labs; 1 mth- AWV; 6 mths- , medication refills, fasting labs or prn sooner. Subjective   SUBJECTIVE/OBJECTIVE:  HPI  Patient comes today for follow up of-     HLP- She takes Atorvastatin 20mg in the morning, breakfast or lunch are the heaviest meals, cooks with canola oil, uses some margarine with her vegetables, eats more chicken, has some sausages with breakfast, overall doing good with her diet. She accidentally ate some breakfast today. (Vit B12 deficiency with Anemia- was given Vit B12 inj at the hospital, then was prescribed Vit B12 2000 mcg capsule daily- stopped taking it for more than 1 year, Vit B12 labs were normal in 2020 and 2021. She has also ran out of her MVI for 2 months now. Say she has always been Anemic, denies any associated symptoms, denies feeling tired, dizzy. She sees Hematologist, Dr Zelalem Olivares.)     (Pulmonary embolism, rash- saw the Hematologist, Dr Zelalem Olivares, had several tests done to investigate, continues to take Xarelto 20mg daily.  Her rash and itching has resolved since she took the Covid vaccine. She was taking Hydroxyzine, has taken it 2-3 times since the Covid vaccine, no rash today or itching and requests Hydroxyzine refill. Says she started itching after taking an otc cough medicine last week but otherwise she has not been itching in general. She has not been taking Benadryl - reports drowsiness on it, alcohol rub helps.)      HTN, Palpitations, Obesity- denies any associated symptoms, says her BP are now normal now- like it was today. She denies heart racing, known thyroid problems, no chest pain, SOB, sweating, headaches, dizziness, swelling in legs. She had a PE in September 2019 and now takes Xarelto- sees Dr Briant Galeazzi (hematologist). (Her HCTZ was stopped in the hospital, we then increased Losartan 50mg to 100mg- was stopped as BP were still high)- now takes Valsartan 320 mg in am, (her cardiologist stopped Metoprolol 25 mg plus 50mg bid ) and takes Carvedilol 25 mg bid. She tries to avoid salt in her diet, cooks at home, has lost about 2 lbs in the last 6 months, just restarted walking 3 times a week for about 30 mins, has cut back on portion sizes. Last eye exam was in March 2020- goes every 2 years. Review of Systems   Constitutional:  Negative for chills and fever. HENT:  Negative for ear pain and sinus pain. Eyes:  Negative for photophobia and visual disturbance. Respiratory:  Negative for cough, shortness of breath and wheezing. Cardiovascular:  Negative for chest pain, palpitations and leg swelling. Gastrointestinal:  Negative for abdominal pain, diarrhea, nausea and vomiting. Neurological:  Negative for dizziness, light-headedness and headaches. Objective   Physical Exam  Vitals and nursing note reviewed. Constitutional:       General: She is not in acute distress. Appearance: She is obese. HENT:      Mouth/Throat:      Mouth: Mucous membranes are moist.      Pharynx: Oropharynx is clear. Cardiovascular:      Rate and Rhythm: Normal rate and regular rhythm. Pulmonary:      Effort: Pulmonary effort is normal.      Breath sounds: Normal breath sounds. Abdominal:      General: Bowel sounds are normal.      Palpations: Abdomen is soft. Tenderness: There is no abdominal tenderness. Musculoskeletal:      Cervical back: Neck supple. No tenderness. Right lower leg: No edema. Left lower leg: No edema. Neurological:      Mental Status: She is alert. Mental status is at baseline. Gait: Gait normal.                An electronic signature was used to authenticate this note.     --Madelaine Florence MD

## 2022-08-24 ENCOUNTER — NURSE ONLY (OUTPATIENT)
Dept: FAMILY MEDICINE CLINIC | Facility: CLINIC | Age: 77
End: 2022-08-24

## 2022-08-24 DIAGNOSIS — E78.2 MIXED HYPERLIPIDEMIA: ICD-10-CM

## 2022-08-24 DIAGNOSIS — E78.2 MIXED HYPERLIPIDEMIA: Primary | ICD-10-CM

## 2022-08-24 LAB
CHOLEST SERPL-MCNC: 116 MG/DL
HDLC SERPL-MCNC: 46 MG/DL (ref 40–60)
HDLC SERPL: 2.5 {RATIO}
LDLC SERPL CALC-MCNC: 54 MG/DL
TRIGL SERPL-MCNC: 80 MG/DL (ref 35–150)
VLDLC SERPL CALC-MCNC: 16 MG/DL (ref 6–23)

## 2022-09-21 ENCOUNTER — OFFICE VISIT (OUTPATIENT)
Dept: FAMILY MEDICINE CLINIC | Facility: CLINIC | Age: 77
End: 2022-09-21
Payer: MEDICAID

## 2022-09-21 VITALS
TEMPERATURE: 97.3 F | BODY MASS INDEX: 31.91 KG/M2 | WEIGHT: 152 LBS | SYSTOLIC BLOOD PRESSURE: 136 MMHG | HEIGHT: 58 IN | HEART RATE: 68 BPM | RESPIRATION RATE: 16 BRPM | DIASTOLIC BLOOD PRESSURE: 86 MMHG | OXYGEN SATURATION: 96 %

## 2022-09-21 DIAGNOSIS — Z00.00 MEDICARE ANNUAL WELLNESS VISIT, SUBSEQUENT: Primary | ICD-10-CM

## 2022-09-21 DIAGNOSIS — Z23 ENCOUNTER FOR IMMUNIZATION: ICD-10-CM

## 2022-09-21 PROCEDURE — 1123F ACP DISCUSS/DSCN MKR DOCD: CPT | Performed by: FAMILY MEDICINE

## 2022-09-21 PROCEDURE — G0008 ADMIN INFLUENZA VIRUS VAC: HCPCS | Performed by: FAMILY MEDICINE

## 2022-09-21 PROCEDURE — G0439 PPPS, SUBSEQ VISIT: HCPCS | Performed by: FAMILY MEDICINE

## 2022-09-21 PROCEDURE — 90694 VACC AIIV4 NO PRSRV 0.5ML IM: CPT | Performed by: FAMILY MEDICINE

## 2022-09-21 RX ORDER — ZOSTER VACCINE RECOMBINANT, ADJUVANTED 50 MCG/0.5
0.5 KIT INTRAMUSCULAR SEE ADMIN INSTRUCTIONS
Qty: 0.5 ML | Refills: 1 | Status: SHIPPED | OUTPATIENT
Start: 2022-09-21 | End: 2023-03-20

## 2022-09-21 ASSESSMENT — PATIENT HEALTH QUESTIONNAIRE - PHQ9
SUM OF ALL RESPONSES TO PHQ QUESTIONS 1-9: 0
1. LITTLE INTEREST OR PLEASURE IN DOING THINGS: 0
2. FEELING DOWN, DEPRESSED OR HOPELESS: 0
SUM OF ALL RESPONSES TO PHQ9 QUESTIONS 1 & 2: 0
SUM OF ALL RESPONSES TO PHQ QUESTIONS 1-9: 0

## 2022-09-21 ASSESSMENT — LIFESTYLE VARIABLES
HOW MANY STANDARD DRINKS CONTAINING ALCOHOL DO YOU HAVE ON A TYPICAL DAY: PATIENT DOES NOT DRINK
HOW OFTEN DO YOU HAVE A DRINK CONTAINING ALCOHOL: NEVER

## 2022-09-21 NOTE — PROGRESS NOTES
Medicare Annual Wellness Visit    Jessica Cherry is here for Medicare AWV    Assessment & Plan   Medicare annual wellness visit, subsequent  Encounter for immunization  -     Influenza, FLUAD, (age 72 y+), IM, Preservative Free, 0.5 mL    Recommendations for Preventive Services Due: see orders and patient instructions/AVS.  Recommended screening schedule for the next 5-10 years is provided to the patient in written form: see Patient Instructions/AVS.     Patient does not have a living will nor a healthcare POA. Advised patient to make a Living Will, Advance Directives and a Healthcare Power of . Also advised patient to bring a copy of the same to their chart - patient understands and agrees. She was given the forms for the [facility] and AD again today. Last TDaP was on 3/2/2021; she was given a prescription again today for the Shingrix vaccine to take to the pharmacy, Flu vaccine was given today (9/21/22),  Prevnar- 13 on 11/7/2019, Pneumovax- 23 on 3/2/2021; Covid vaccine on 3/11/21 ([employer]), 1/19/22 ([employer]), advised to take another booster. She did the Cologuard on 11/20/2019; has never had a colonoscopy before and refuses it; denies any GI issues, denies family h/o colon cancer. She has now aged out. She has aged out for mammogram, denies any breast concerns; sister had breast cancer in her early 76s. She refused the bone density test- has never had this before. Return for Medicare Annual Wellness Visit in 1 year. Subjective       Patient's complete Health Risk Assessment and screening values have been reviewed and are found in Flowsheets. The following problems were reviewed today and where indicated follow up appointments were made and/or referrals ordered.     Positive Risk Factor Screenings with Interventions:             General Health and ACP:  General  In general, how would you say your health is?: Good  In the past 7 days, have you experienced any of the following: New or Increased Pain, New or Increased Fatigue, Loneliness, Social Isolation, Stress or Anger?: No  Do you get the social and emotional support that you need?: Yes  Do you have a Living Will?: (!) No    Advance Directives       Power of  Living Will ACP-Advance Directive ACP-Power of     Not on File Not on File Not on File Not on File          General Health Risk Interventions:  No Living Will: Advance Care Planning addressed with patient today    Health Habits/Nutrition:  Physical Activity: Insufficiently Active    Days of Exercise per Week: 2 days    Minutes of Exercise per Session: 30 min     Have you lost any weight without trying in the past 3 months?: No  Body mass index: (!) 31.76  Have you seen the dentist within the past year?: (!) No  Health Habits/Nutrition Interventions:  Does not have any teeth    Hearing/Vision:  Do you or your family notice any trouble with your hearing that hasn't been managed with hearing aids?: No  Do you have difficulty driving, watching TV, or doing any of your daily activities because of your eyesight?: No  Have you had an eye exam within the past year?: (!) No  No results found. Hearing/Vision Interventions:  Advised to schedule an eye exam    Safety:  Do you have working smoke detectors?: Yes  Do you have any tripping hazards - loose or unsecured carpets or rugs?: No  Do you have any tripping hazards - clutter in doorways, halls, or stairs?: No  Do you have either shower bars, grab bars, non-slip mats or non-slip surfaces in your shower or bathtub?: (!) No  Do all of your stairways have a railing or banister?: Not Applicable  Do you always fasten your seatbelt when you are in a car?: Yes  Safety Interventions:  Discussed with patient           Objective   Vitals:    09/21/22 1426   BP: 136/86   Pulse: 68   Resp: 16   Temp: 97.3 °F (36.3 °C)   TempSrc: Tympanic   SpO2: 96%   Weight: 152 lb (68.9 kg)   Height: 4' 10\" (1.473 m)      Body mass index is 31.77 kg/m². Allergies   Allergen Reactions    Codeine Swelling     Swelling of face and lips     Prior to Visit Medications    Medication Sig Taking?  Authorizing Provider   atorvastatin (LIPITOR) 20 MG tablet Take 1 tablet by mouth daily Yes Eda Closs, MD   carvedilol (COREG) 25 MG tablet Take 1 tablet by mouth 2 times daily (with meals) Yes Eda Closs, MD   valsartan (DIOVAN) 320 MG tablet Take 1 tablet by mouth daily Yes Eda Closs, MD   Multiple Vitamin (MULTIVITAMIN ADULT PO) Take 1 tablet by mouth daily Yes Historical Provider, MD   rivaroxaban (XARELTO) 20 MG TABS tablet Take 1 tablet by mouth daily (with breakfast) Yes Eriberto Brown MD   hydrOXYzine (ATARAX) 25 MG tablet TAKE 1 TAB BY MOUTH THREE (3) TIMES DAILY AS NEEDED FOR ITCHING Yes Ar Automatic Reconciliation       CareTeam (Including outside providers/suppliers regularly involved in providing care):   Patient Care Team:  Eda Closs, MD as PCP - General  Eda Closs, MD as PCP - [facility] Empaneled Provider     Reviewed and updated this visit:  Tobacco  Allergies  Meds  Problems  Med Hx  Surg Hx  Soc Hx  Fam Hx

## 2022-09-21 NOTE — PATIENT INSTRUCTIONS
Personalized Preventive Plan for Lino Chavez - 9/21/2022  Medicare offers a range of preventive health benefits. Some of the tests and screenings are paid in full while other may be subject to a deductible, co-insurance, and/or copay. Some of these benefits include a comprehensive review of your medical history including lifestyle, illnesses that may run in your family, and various assessments and screenings as appropriate. After reviewing your medical record and screening and assessments performed today your provider may have ordered immunizations, labs, imaging, and/or referrals for you. A list of these orders (if applicable) as well as your Preventive Care list are included within your After Visit Summary for your review. Other Preventive Recommendations:    A preventive eye exam performed by an eye specialist is recommended every 1-2 years to screen for glaucoma; cataracts, macular degeneration, and other eye disorders. A preventive dental visit is recommended every 6 months. Try to get at least 150 minutes of exercise per week or 10,000 steps per day on a pedometer . Order or download the FREE \"Exercise & Physical Activity: Your Everyday Guide\" from [employer]. Call 0-420.559.9447 or search [employer] online. You need 3519-3826 mg of calcium and 0320-3320 IU of vitamin D per day. It is possible to meet your calcium requirement with diet alone, but a vitamin D supplement is usually necessary to meet this goal.  When exposed to the sun, use a sunscreen that protects against both UVA and UVB radiation with an SPF of 30 or greater. Reapply every 2 to 3 hours or after sweating, drying off with a towel, or swimming. Always wear a seat belt when traveling in a car. Always wear a helmet when riding a bicycle or motorcycle.

## 2022-09-21 NOTE — ACP (ADVANCE CARE PLANNING)
Date of ACP Conversation: 9/21/2022  Persons included in Conversation: Patient  Length of ACP Conversation in minutes: 5 minutes    Authorized Decision Maker (if patient is incapable of making informed decisions): NA          For Patients with Decision Making Capacity:   Patient does not have a Living Will, Advance Directives or Healthcare Power of . Conversation Outcomes / Follow-Up Plan:   Advised patient to make a Living Will, Advance Directives and a Healthcare Power of . Also advised patient to bring a copy of the same to their chart - patient understands and agrees.

## 2022-09-27 DIAGNOSIS — D68.59 OTHER PRIMARY THROMBOPHILIA (HCC): Primary | ICD-10-CM

## 2022-09-28 ENCOUNTER — OFFICE VISIT (OUTPATIENT)
Dept: ONCOLOGY | Age: 77
End: 2022-09-28
Payer: MEDICARE

## 2022-09-28 ENCOUNTER — HOSPITAL ENCOUNTER (OUTPATIENT)
Dept: LAB | Age: 77
Discharge: HOME | End: 2022-10-01
Payer: MEDICARE

## 2022-09-28 VITALS
HEIGHT: 58 IN | RESPIRATION RATE: 18 BRPM | DIASTOLIC BLOOD PRESSURE: 85 MMHG | HEART RATE: 76 BPM | WEIGHT: 157 LBS | BODY MASS INDEX: 32.95 KG/M2 | TEMPERATURE: 98 F | OXYGEN SATURATION: 100 % | SYSTOLIC BLOOD PRESSURE: 150 MMHG

## 2022-09-28 DIAGNOSIS — Z86.711 PERSONAL HISTORY OF PULMONARY EMBOLISM: Primary | ICD-10-CM

## 2022-09-28 DIAGNOSIS — D68.59 OTHER PRIMARY THROMBOPHILIA (HCC): ICD-10-CM

## 2022-09-28 DIAGNOSIS — Z79.01 LONG TERM (CURRENT) USE OF ANTICOAGULANTS: ICD-10-CM

## 2022-09-28 LAB
ALBUMIN SERPL-MCNC: 3.5 G/DL (ref 3.2–4.6)
ALBUMIN/GLOB SERPL: 0.8 {RATIO} (ref 1.2–3.5)
ALP SERPL-CCNC: 177 U/L (ref 50–136)
ALT SERPL-CCNC: 28 U/L (ref 12–65)
ANION GAP SERPL CALC-SCNC: 4 MMOL/L (ref 4–13)
AST SERPL-CCNC: 15 U/L (ref 15–37)
BASOPHILS # BLD: 0 K/UL (ref 0–0.2)
BASOPHILS NFR BLD: 0 % (ref 0–2)
BILIRUB SERPL-MCNC: 0.6 MG/DL (ref 0.2–1.1)
BUN SERPL-MCNC: 18 MG/DL (ref 8–23)
CALCIUM SERPL-MCNC: 8.9 MG/DL (ref 8.3–10.4)
CHLORIDE SERPL-SCNC: 109 MMOL/L (ref 101–110)
CO2 SERPL-SCNC: 28 MMOL/L (ref 21–32)
CREAT SERPL-MCNC: 1 MG/DL (ref 0.6–1)
D DIMER PPP FEU-MCNC: <0.27 UG/ML(FEU)
DIFFERENTIAL METHOD BLD: NORMAL
EOSINOPHIL # BLD: 0.4 K/UL (ref 0–0.8)
EOSINOPHIL NFR BLD: 5 % (ref 0.5–7.8)
ERYTHROCYTE [DISTWIDTH] IN BLOOD BY AUTOMATED COUNT: 14.6 % (ref 11.9–14.6)
GLOBULIN SER CALC-MCNC: 4.3 G/DL (ref 2.3–3.5)
GLUCOSE SERPL-MCNC: 102 MG/DL (ref 65–100)
HCT VFR BLD AUTO: 40.4 % (ref 35.8–46.3)
HGB BLD-MCNC: 13.3 G/DL (ref 11.7–15.4)
IMM GRANULOCYTES # BLD AUTO: 0 K/UL (ref 0–0.5)
IMM GRANULOCYTES NFR BLD AUTO: 0 % (ref 0–5)
LYMPHOCYTES # BLD: 2.7 K/UL (ref 0.5–4.6)
LYMPHOCYTES NFR BLD: 32 % (ref 13–44)
MCH RBC QN AUTO: 29.9 PG (ref 26.1–32.9)
MCHC RBC AUTO-ENTMCNC: 32.9 G/DL (ref 31.4–35)
MCV RBC AUTO: 90.8 FL (ref 79.6–97.8)
MONOCYTES # BLD: 0.6 K/UL (ref 0.1–1.3)
MONOCYTES NFR BLD: 7 % (ref 4–12)
NEUTS SEG # BLD: 4.6 K/UL (ref 1.7–8.2)
NEUTS SEG NFR BLD: 56 % (ref 43–78)
NRBC # BLD: 0 K/UL (ref 0–0.2)
PLATELET # BLD AUTO: 225 K/UL (ref 150–450)
PMV BLD AUTO: 10.3 FL (ref 9.4–12.3)
POTASSIUM SERPL-SCNC: 3.9 MMOL/L (ref 3.5–5.1)
PROT SERPL-MCNC: 7.8 G/DL (ref 6.3–8.2)
RBC # BLD AUTO: 4.45 M/UL (ref 4.05–5.2)
SODIUM SERPL-SCNC: 141 MMOL/L (ref 136–145)
WBC # BLD AUTO: 8.4 K/UL (ref 4.3–11.1)

## 2022-09-28 PROCEDURE — 99214 OFFICE O/P EST MOD 30 MIN: CPT | Performed by: INTERNAL MEDICINE

## 2022-09-28 PROCEDURE — 80053 COMPREHEN METABOLIC PANEL: CPT

## 2022-09-28 PROCEDURE — 85025 COMPLETE CBC W/AUTO DIFF WBC: CPT

## 2022-09-28 PROCEDURE — 1123F ACP DISCUSS/DSCN MKR DOCD: CPT | Performed by: INTERNAL MEDICINE

## 2022-09-28 PROCEDURE — 85379 FIBRIN DEGRADATION QUANT: CPT

## 2022-09-28 PROCEDURE — 36415 COLL VENOUS BLD VENIPUNCTURE: CPT

## 2022-09-28 ASSESSMENT — PATIENT HEALTH QUESTIONNAIRE - PHQ9
1. LITTLE INTEREST OR PLEASURE IN DOING THINGS: 0
SUM OF ALL RESPONSES TO PHQ QUESTIONS 1-9: 0
SUM OF ALL RESPONSES TO PHQ9 QUESTIONS 1 & 2: 0
SUM OF ALL RESPONSES TO PHQ QUESTIONS 1-9: 0
2. FEELING DOWN, DEPRESSED OR HOPELESS: 0

## 2022-09-28 NOTE — PROGRESS NOTES
Data Source: Patient, [facility] record. 9/28/2022    11:46 AM    Bijal Chanel [id number]    69 y.o. Patient Encounter: [facility] Visit    Heme Diagnosis:  PE  Heme History (Copied from prior):   74F textile , non-smoker, denies any HRT use h/o KIAN, hypertension, dyslipidemia, allergic rhinitis, cholecystectomy more recently admitted via [facility] ED where she presented with cough x 2-3 weeks and right sided chest discomfort x 1 day. CTA chest in ED 9/21/2019 had shown RLL segmental PE with small infiltrates versus early pulmonary infarcts at the right base. She was hospitalized from 9/21/2019 to 9/23/2019. Echo without RT ventricular strain. She was discharged on rivaroxaban. She subsequently represented to ED 9/29/2019 with facial itching. Per ED note, no tongue swelling, breathing difficulties, or stridor noted. Patient was felt to have allergic reaction to possibly rivaroxaban versus recently changed blood pressure or cough medications, advised Benadryl as needed. She is now seen by us in terms of anticoagulation recommendation. Patient reports family history significant for mother having PE in her 80s. Denies any other personal history of thrombophilia or miscarriages. Denies any prolonged travel, hospitalizations, prolonged immobilization, or trauma to extremities in the 6 months prior to thromboembolic event. Denies any limb swelling prior to the blood clot. Denies any recent mammogram, screening colonoscopies, or pap smears. She reports most of her allergy symptoms as resolved and now off benadryl. She would like to stay on rivaroxaban as she is not certain that is the cause of her allergy symptoms. Denies any blood per rectum. No significant LE swelling noted today. Interval History:  9/28/2022: Reports doing well. Remains on rivaroxaban 20 mg daily, denies any falls, bleeding, LE swelling, shortness of breath or chest discomfort.   Blood work today unremarkable. Discussed options, if D-dimer comes back negative then will drop her rivaroxaban down to 10 mg daily. We will see her back in 6 months. She is educated on S/S of recurrent thrombophilia and to seek urgent care in such case. REVIEW OF SYSTEMS:  As mentioned above, all other systems were reviewed in full and are negative. Past Medical History:   Diagnosis Date    AR (allergic rhinitis)     Elevated LDL cholesterol level     H/O iron deficiency anemia     Hypertension     well controlled    Mild hyperlipidemia     Pulmonary emboli (HCC)     Pulmonary embolus (HCC)        Past Surgical History:   Procedure Laterality Date    CHOLECYSTECTOMY         Current Outpatient Medications   Medication Sig Dispense Refill    atorvastatin (LIPITOR) 20 MG tablet Take 1 tablet by mouth daily 90 tablet 1    carvedilol (COREG) 25 MG tablet Take 1 tablet by mouth 2 times daily (with meals) 180 tablet 1    valsartan (DIOVAN) 320 MG tablet Take 1 tablet by mouth daily 90 tablet 1    Multiple Vitamin (MULTIVITAMIN ADULT PO) Take 1 tablet by mouth daily      rivaroxaban (XARELTO) 20 MG TABS tablet Take 1 tablet by mouth daily (with breakfast) 30 tablet 11    hydrOXYzine (ATARAX) 25 MG tablet TAKE 1 TAB BY MOUTH THREE (3) TIMES DAILY AS NEEDED FOR ITCHING      zoster recombinant adjuvanted vaccine (SHINGRIX) 50 MCG/0.5ML SUSR injection Inject 0.5 mLs into the muscle See Admin Instructions 1 dose now and repeat in 2-6 months (Patient not taking: Reported on 9/28/2022) 0.5 mL 1     No current facility-administered medications for this visit. 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   Tobacco Use    Smoking status: Never    Smokeless tobacco: Never   Vaping Use    Vaping Use: Never used   Substance and Sexual Activity    Alcohol use: No     Alcohol/week: 0.0 standard drinks    Drug use: No    Sexual activity: Not Currently     Partners: Male     Social Determinants of Health     Physical Activity: Insufficiently Active    Days of Exercise per Week: 2 days    Minutes of Exercise per Session: 30 min       Family History   Problem Relation Age of Onset    Stroke Father 48    Breast Cancer Sister         in her early 76s       Allergies   Allergen Reactions    Codeine Swelling     Swelling of face and lips       PHYSICAL EXAMINATION:  General Appearance: Healthy appearing patient in no acute distress  Vitals reviewed. BP (!) 150/85 (Site: Left Upper Arm, Position: Sitting)   Pulse 76   Temp 98 °F (36.7 °C) (Oral)   Resp 18   Ht 4' 10\" (1.473 m)   Wt 157 lb (71.2 kg)   SpO2 100%   BMI 32.81 kg/m²   HEENT: No oral or pharyngeal masses, ulceration or thrush noted, no sinus tenderness. Neck is supple with no thyromegaly or JVD noted. Lymph Nodes: No lymphadenopathy noted in the occipital, pre and post auricular, cervical, supra and infraclavicular, axillary, epitrochlear, inguinal, and popliteal region. Breasts: No palpable masses, nipple discharge or skin retraction  Lungs/Thorax: Clear to auscultation, no accessory muscles of respiration being used. Heart: Regular rate and rhythm, normal S1, S2, no appreciable murmurs, rubs, gallops  Abdomen: Soft, nontender, bowel sounds present, no appreciable hepatosplenomegaly, no palpable masses  Extremeties: Good pulses bilaterally, no peripheral edema. Skin: Normal skin tone with no rash, petechiae, ecchymosis noted.   Musculoskeletal: No pain on palpation over bony prominence, no edema, no evidence of gout, no joint or bony deformity  Neurologic: Grossly intact        LABS/IMAGING:    Lab Results   Component Value Date/Time    WBC 8.4 09/28/2022 11:06 AM    HGB 13.3 09/28/2022 11:06 AM    HCT 40.4 09/28/2022 11:06 AM     09/28/2022 11:06 AM    MCV 90.8 09/28/2022 11:06 AM       Lab Results   Component Value Date/Time     09/28/2022 11:06 AM    K 3.9 09/28/2022 11:06 AM     09/28/2022 11:06 AM    CO2 28 09/28/2022 11:06 AM    BUN 18 09/28/2022 11:06 AM    GFRAA >60 09/28/2022 11:06 AM    GLOB 4.3 09/28/2022 11:06 AM    ALT 28 09/28/2022 11:06 AM           Above results reviewed with patient. 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 CTA chest in ED 9/21/2019 had shown RLL segmental PE with small infiltrates versus early pulmonary infarcts at the right base. She was hospitalized from 9/21/2019 to 9/23/2019. Echo without RT ventricular strain. She was discharged on rivaroxaban. She subsequently represented to ED 9/29/2019 with facial itching. Per ED note, no tongue swelling, breathing difficulties, or stridor noted. Patient was felt to have allergic reaction to possibly rivaroxaban versus recently changed blood pressure or cough medications, advised Benadryl as needed. She is now seen by us in terms of anticoagulation recommendation. Patient reports family history significant for mother having PE in her 80s. Denies any other personal history of thrombophilia or miscarriages. Denies any prolonged travel, hospitalizations, prolonged immobilization, or trauma to extremities in the 6 months prior to thromboembolic event. Denies any limb swelling prior to the blood clot. Denies any recent mammogram, screening colonoscopies, or pap smears. She reports most of her allergy symptoms as resolved and now off benadryl. She would like to stay on rivaroxaban as she is not certain that is the cause of her allergy symptoms. Denies any blood per rectum. No significant LE swelling noted today.       In summary, elderly female with recent presentation of what appears to be an unprovoked PE without any preceding limb swelling. Given her presentation in absence of any lifestyle modifiable factors, she will likely need long term anticoagulation. 12/4/19:Labs from previous visit reviewed: Prothrombin gene mutation negative. Anti-cardiolipin antibodies and anti-beta-2 glycoprotein antibodies negative. Remaining hypercoagulable work-up sent out today. D-dimer normal range 10/19. Bilateral LE Dopplers 11/19 -ve. Iron stores adequate. On vitamin B12, MCV normalized, platelet count normal.  Denies any bleeding, falls or trauma. Continue rivaroxaban. Notes off and on skin rash, possibly relating to recent change in her anti-hypertensive which she will discuss further w/ her primary care. 6/9/2020: Discussed case over the telephone. Patient remains on rivaroxaban 20 mg daily. Reports tolerating well, denies any bleeding, falls or trauma. Blood work from last visit with remaining hypercoagulable work-up reviewed: ATIII, protein C, protein S, Factor V Leiden, and Lupus anticoagulant unremarkable. More recent routine blood work normal range, d-dimer negative. Did not get chest x-ray. Given good tolerance, will continue patient on rivaroxaban. We will simply see her back in 6 months time with repeat labs, d-dimer. 9/29/21: Reports doing well. Remains on Rivaroxaban, denies any bleeding, falls or trauma. Lungs are clear with good breath sounds to bases. Denies any S/S of recurrent thrombophilia. Labs today unremarkable. She will continue with long-term anticoagulation. 9/28/2022: Reports doing well. Remains on rivaroxaban 20 mg daily, denies any falls, bleeding, LE swelling, shortness of breath or chest discomfort. Blood work today unremarkable. Discussed options, if D-dimer comes back negative then will drop her rivaroxaban down to 10 mg daily. We will see her back in 6 months.   She is educated on S/S of recurrent thrombophilia and to seek urgent care in such case. 1. PE, unprovoked    PLAN:  - As above. Continue anticoagulation long term, as long as tolerating well. Will drop rivaroxaban from 20 mg to 10 mg daily (9/22)  - Serial D dimer  - Hypercoagulable w/u including ATIII, protein C & S, factor 5 Leiden mutation, lupus anticoagulant, Prothrombin gene mutation,  Anti-cardiolipin and anti-[id number] ab -ve. - High MCV w B12 deficiency: on vitamin B12, Iron stores adequate. MCV now normal   - Age appropriate cancer screening per primary care. [place] 11/20/19 -ve. RTC in 6 months w/ labs, d dimer. Every 6 monthly visits. Thank you Dr Jordin Hill for allowing us to paticipate in care of this pleasant patient.  Please call w/ any ramya Medina MD  [address] [place] [address],[address]  Hematology Oncology  [address] [place] [address]  Office : [phone]  Fax : [phone]

## 2022-09-28 NOTE — PATIENT INSTRUCTIONS
Patient Instructions from Today's Visit    Reason for Visit:  Follow up     Diagnosis Information:  [email or web address]/. net/about-us/asco-answers-patient-education-materials/uewi-dlmjozm-lhih-sheets  Patient was educated and given handouts published by [employer] entitled [employer] Answers Fact Sheets about their diagnosis of  during todays office visit. Plan: We are waiting on your d-dimer lab results (Lab test looking for microscopic levels of blood clots in your system). If this comes back negative, we will decrease your xarelto to 10 mg daily. We will call you this afternoon with these results. Blood clots in your legs cause damage to the valves in your veins. When the valves are damaged, blood can pool in your legs and cause swelling in your legs. This puts you at risk for more blood clots. Because of this, we recommend wearing knee high compression socks to help prevent this. We recommend either 15-20 mmHg or 20-30 mmHg compression. Call our office with any signs of blood clots in your legs are arms:   - redness, pain, swelling in one extremity but not the other  Go to the nearest emergency department if you have any signs of blood clots in your lungs:   - sudden shortness of breath   - sudden chest or upper back pain     Follow Up:   Follow up in 6 months    Recent Lab Results:  Hospital Outpatient Visit on 09/28/2022   Component Date Value Ref Range Status    WBC 09/28/2022 8.4  4.3 - 11.1 K/uL Final    RBC 09/28/2022 4.45  4.05 - 5.2 M/uL Final    Hemoglobin 09/28/2022 13.3  11.7 - 15.4 g/dL Final    Hematocrit 09/28/2022 40.4  35.8 - 46.3 % Final    MCV 09/28/2022 90.8  79.6 - 97.8 FL Final    MCH 09/28/2022 29.9  26.1 - 32.9 PG Final    MCHC 09/28/2022 32.9  31.4 - 35.0 g/dL Final    RDW 09/28/2022 14.6  11.9 - 14.6 % Final    Platelets 63/63/7289 225  150 - 450 K/uL Final    MPV 09/28/2022 10.3  9.4 - 12.3 FL Final    nRBC 09/28/2022 0.00  0.0 - 0.2 K/uL Final    **Note: Absolute NRBC parameter is now reported with Hemogram**    Seg Neutrophils 09/28/2022 56  43 - 78 % Final    Lymphocytes 09/28/2022 32  13 - 44 % Final    Monocytes 09/28/2022 7  4.0 - 12.0 % Final    Eosinophils % 09/28/2022 5  0.5 - 7.8 % Final    Basophils 09/28/2022 0  0.0 - 2.0 % Final    Immature Granulocytes 09/28/2022 0  0.0 - 5.0 % Final    Segs Absolute 09/28/2022 4.6  1.7 - 8.2 K/UL Final    Absolute Lymph # 09/28/2022 2.7  0.5 - 4.6 K/UL Final    Absolute Mono # 09/28/2022 0.6  0.1 - 1.3 K/UL Final    Absolute Eos # 09/28/2022 0.4  0.0 - 0.8 K/UL Final    Basophils Absolute 09/28/2022 0.0  0.0 - 0.2 K/UL Final    Absolute Immature Granulocyte 09/28/2022 0.0  0.0 - 0.5 K/UL Final    Differential Type 09/28/2022 AUTOMATED    Final    Sodium 09/28/2022 141  136 - 145 mmol/L Final    Potassium 09/28/2022 3.9  3.5 - 5.1 mmol/L Final    Chloride 09/28/2022 109  101 - 110 mmol/L Final    CO2 09/28/2022 28  21 - 32 mmol/L Final    Anion Gap 09/28/2022 4  4 - 13 mmol/L Final    Glucose 09/28/2022 102 (A)  65 - 100 mg/dL Final    BUN 09/28/2022 18  8 - 23 MG/DL Final    Creatinine 09/28/2022 1.00  0.6 - 1.0 MG/DL Final    GFR  09/28/2022 >60  >60 ml/min/1.73m2 Final    GFR Non- 09/28/2022 57 (A)  >60 ml/min/1.73m2 Final    Comment:      Estimated GFR is calculated using the Modification of Diet in Renal Disease (MDRD) Study equation, reported for both  Americans (GFRAA) and non- Americans (GFRNA), and normalized to 1.73m2 body surface area. The physician must decide which value applies to the patient. The MDRD study equation should only be used in individuals age 25 or older. It has not been validated for the following: pregnant women, patients with serious comorbid conditions,or on certain medications, or persons with extremes of body size, muscle mass, or nutritional status.       Calcium 09/28/2022 8.9  8.3 - 10.4 MG/DL Final    Total Bilirubin 09/28/2022 0.6  0.2 - 1.1 MG/DL Final    ALT 09/28/2022 28  12 - 65 U/L Final    AST 09/28/2022 15  15 - 37 U/L Final    Alk Phosphatase 09/28/2022 177 (A)  50 - 136 U/L Final    Total Protein 09/28/2022 7.8  6.3 - 8.2 g/dL Final    Albumin 09/28/2022 3.5  3.2 - 4.6 g/dL Final    Globulin 09/28/2022 4.3 (A)  2.3 - 3.5 g/dL Final    Albumin/Globulin Ratio 09/28/2022 0.8 (A)  1.2 - 3.5   Final    D-Dimer, Quant 09/28/2022 <0.27  <0.56 ug/ml(FEU) Final    Comment: (NOTE)  A D-Dimer result less than 0.5 ug/mL FEU combined with a low clinical   pretest probability of DVT and/or PE has a negative predictive value   of %. The positive predictive value is 50% or less. Treatment Summary has been discussed and given to patient: n/a        -------------------------------------------------------------------------------------------------------------------  Please call our office at [phone] if you have any  of the following symptoms:   Fever of 100.5 or greater  Chills  Shortness of breath  Swelling or pain in one leg    After office hours an answering service is available and will contact a provider for emergencies or if you are experiencing any of the above symptoms. Patient did express an interest in My Chart. My Chart log in information explained on the after visit summary printout at the [facility] desk.     Delmar Dockery RN

## 2023-02-14 ENCOUNTER — OFFICE VISIT (OUTPATIENT)
Dept: FAMILY MEDICINE CLINIC | Facility: CLINIC | Age: 78
End: 2023-02-14
Payer: MEDICARE

## 2023-02-14 VITALS
SYSTOLIC BLOOD PRESSURE: 138 MMHG | RESPIRATION RATE: 16 BRPM | BODY MASS INDEX: 32.54 KG/M2 | HEART RATE: 75 BPM | TEMPERATURE: 98 F | OXYGEN SATURATION: 97 % | WEIGHT: 155 LBS | HEIGHT: 58 IN | DIASTOLIC BLOOD PRESSURE: 88 MMHG

## 2023-02-14 DIAGNOSIS — E78.2 MIXED HYPERLIPIDEMIA: Primary | ICD-10-CM

## 2023-02-14 DIAGNOSIS — E66.9 OBESITY (BMI 30-39.9): ICD-10-CM

## 2023-02-14 DIAGNOSIS — I10 ESSENTIAL HYPERTENSION: ICD-10-CM

## 2023-02-14 DIAGNOSIS — R21 RASH AND OTHER NONSPECIFIC SKIN ERUPTION: ICD-10-CM

## 2023-02-14 DIAGNOSIS — I27.82 OTHER CHRONIC PULMONARY EMBOLISM WITHOUT ACUTE COR PULMONALE (HCC): ICD-10-CM

## 2023-02-14 PROBLEM — N18.30 CHRONIC RENAL DISEASE, STAGE III (HCC): Status: RESOLVED | Noted: 2022-08-22 | Resolved: 2023-02-14

## 2023-02-14 PROBLEM — D51.9 ANEMIA DUE TO VITAMIN B12 DEFICIENCY: Status: RESOLVED | Noted: 2019-10-06 | Resolved: 2023-02-14

## 2023-02-14 PROBLEM — I26.99 PULMONARY EMBOLUS (HCC): Status: ACTIVE | Noted: 2019-10-06

## 2023-02-14 PROBLEM — D68.59 THROMBOPHILIA (HCC): Status: RESOLVED | Noted: 2021-09-02 | Resolved: 2023-02-14

## 2023-02-14 PROCEDURE — 1090F PRES/ABSN URINE INCON ASSESS: CPT | Performed by: FAMILY MEDICINE

## 2023-02-14 PROCEDURE — 99214 OFFICE O/P EST MOD 30 MIN: CPT | Performed by: FAMILY MEDICINE

## 2023-02-14 PROCEDURE — G8484 FLU IMMUNIZE NO ADMIN: HCPCS | Performed by: FAMILY MEDICINE

## 2023-02-14 PROCEDURE — 1036F TOBACCO NON-USER: CPT | Performed by: FAMILY MEDICINE

## 2023-02-14 PROCEDURE — G8400 PT W/DXA NO RESULTS DOC: HCPCS | Performed by: FAMILY MEDICINE

## 2023-02-14 PROCEDURE — 3075F SYST BP GE 130 - 139MM HG: CPT | Performed by: FAMILY MEDICINE

## 2023-02-14 PROCEDURE — 1123F ACP DISCUSS/DSCN MKR DOCD: CPT | Performed by: FAMILY MEDICINE

## 2023-02-14 PROCEDURE — G8417 CALC BMI ABV UP PARAM F/U: HCPCS | Performed by: FAMILY MEDICINE

## 2023-02-14 PROCEDURE — G8427 DOCREV CUR MEDS BY ELIG CLIN: HCPCS | Performed by: FAMILY MEDICINE

## 2023-02-14 PROCEDURE — 3078F DIAST BP <80 MM HG: CPT | Performed by: FAMILY MEDICINE

## 2023-02-14 RX ORDER — VALSARTAN 320 MG/1
320 TABLET ORAL DAILY
Qty: 90 TABLET | Refills: 1 | Status: SHIPPED | OUTPATIENT
Start: 2023-02-14

## 2023-02-14 RX ORDER — ATORVASTATIN CALCIUM 20 MG/1
20 TABLET, FILM COATED ORAL DAILY
Qty: 90 TABLET | Refills: 1 | Status: SHIPPED | OUTPATIENT
Start: 2023-02-14

## 2023-02-14 RX ORDER — HYDROXYZINE HYDROCHLORIDE 25 MG/1
25 TABLET, FILM COATED ORAL EVERY 8 HOURS PRN
Qty: 60 TABLET | Refills: 0 | Status: SHIPPED | OUTPATIENT
Start: 2023-02-14

## 2023-02-14 RX ORDER — CARVEDILOL 25 MG/1
25 TABLET ORAL 2 TIMES DAILY WITH MEALS
Qty: 180 TABLET | Refills: 1 | Status: SHIPPED | OUTPATIENT
Start: 2023-02-14

## 2023-02-14 SDOH — ECONOMIC STABILITY: HOUSING INSECURITY
IN THE LAST 12 MONTHS, WAS THERE A TIME WHEN YOU DID NOT HAVE A STEADY PLACE TO SLEEP OR SLEPT IN A SHELTER (INCLUDING NOW)?: NO

## 2023-02-14 SDOH — ECONOMIC STABILITY: FOOD INSECURITY: WITHIN THE PAST 12 MONTHS, YOU WORRIED THAT YOUR FOOD WOULD RUN OUT BEFORE YOU GOT MONEY TO BUY MORE.: SOMETIMES TRUE

## 2023-02-14 SDOH — ECONOMIC STABILITY: FOOD INSECURITY: WITHIN THE PAST 12 MONTHS, THE FOOD YOU BOUGHT JUST DIDN'T LAST AND YOU DIDN'T HAVE MONEY TO GET MORE.: NEVER TRUE

## 2023-02-14 SDOH — ECONOMIC STABILITY: INCOME INSECURITY: HOW HARD IS IT FOR YOU TO PAY FOR THE VERY BASICS LIKE FOOD, HOUSING, MEDICAL CARE, AND HEATING?: HARD

## 2023-02-14 ASSESSMENT — ENCOUNTER SYMPTOMS
PHOTOPHOBIA: 0
WHEEZING: 0
COUGH: 0
NAUSEA: 0
VOMITING: 0
ABDOMINAL PAIN: 0
DIARRHEA: 0
COLOR CHANGE: 0
SORE THROAT: 0
SHORTNESS OF BREATH: 0

## 2023-02-14 ASSESSMENT — PATIENT HEALTH QUESTIONNAIRE - PHQ9
SUM OF ALL RESPONSES TO PHQ9 QUESTIONS 1 & 2: 0
SUM OF ALL RESPONSES TO PHQ QUESTIONS 1-9: 0
2. FEELING DOWN, DEPRESSED OR HOPELESS: 0
1. LITTLE INTEREST OR PLEASURE IN DOING THINGS: 0
SUM OF ALL RESPONSES TO PHQ QUESTIONS 1-9: 0

## 2023-02-14 NOTE — PROGRESS NOTES
Bela Escoto (:  1945) is a 69 y.o. female,Established patient, here for evaluation of the following chief complaint(s):  Medication Refill (Med refill and labs)         ASSESSMENT/PLAN:  1. Mixed hyperlipidemia  -     atorvastatin (LIPITOR) 20 MG tablet; Take 1 tablet by mouth daily, Disp-90 tablet, R-1Normal  -     Lipid Panel; Future  2. Essential hypertension  -     carvedilol (COREG) 25 MG tablet; Take 1 tablet by mouth 2 times daily (with meals), Disp-180 tablet, R-1Normal  -     valsartan (DIOVAN) 320 MG tablet; Take 1 tablet by mouth daily, Disp-90 tablet, R-1Normal  3. Rash and other nonspecific skin eruption  -     hydrOXYzine HCl (ATARAX) 25 MG tablet; Take 1 tablet by mouth every 8 hours as needed for Itching, Disp-60 tablet, R-0Normal  4. Other chronic pulmonary embolism without acute cor pulmonale (HCC)  Assessment & Plan:   Monitored by specialist- no acute findings meriting change in the plan    5. Obesity (BMI 30-39.9)  6. BMI 32.0-32.9,adult    Await labs, Dr Kiki Oppenheim does her CBC and CMP every 6 months- reviewed last results from 2022, to continue to avoid salt and fatty foods. Advised patient to continue to lose weight. Also advised to exercise regularly, to eat healthy foods, and to control portion sizes. Reminded to take the Shingrix vaccine, she again refused the Dexa scan. Return for next week- fasting labs; 6 mths- , medication refills, fasting labs or prn sooner. Subjective   SUBJECTIVE/OBJECTIVE:  Medication Refill  Pertinent negatives include no abdominal pain, chest pain, chills, congestion, coughing, fever, nausea, numbness, rash, sore throat, vomiting or weakness. Patient comes today for follow up of-     HLP- She takes Atorvastatin 20mg in the morning, breakfast or lunch are the heaviest meals, cooks with canola oil, uses some margarine with her vegetables, eats more chicken, has some sausages with breakfast, overall doing good with her diet. She is not fasting today for labs. (Vit B12 deficiency with Anemia- was given Vit B12 inj at the hospital, then was prescribed Vit B12 2000 mcg capsule daily- stopped taking it for more than 1 year, Vit B12 labs were normal in August 2020 and March 2021. She has also ran out of her MVI for 2 months now. Say she has always been Anemic, denies any associated symptoms, denies feeling tired, dizzy. She sees Hematologist, Dr Oc Mclean.)     Pulmonary embolism, rash- saw the Hematologist, Dr Oc Mclean, had several tests done to investigate, continues to take Xarelto - dose was reduced to 10 mg daily. Her rash and itching has resolved since she took the Covid vaccine but does flare up occasionally- about once a month. She then takes Hydroxyzine 1-2 doses and her flare up resolves- requests a refill. Denies rash or itching today- mostly occurs on b/l forearms. She has not been taking Benadryl - reports drowsiness on it, alcohol rub helps. HTN, (Palpitations), Obesity- denies any associated symptoms, says her BP are normal- 130s- 140/ 70s- 80s. She denies heart racing, known thyroid problems, no chest pain, SOB, sweating, headaches, dizziness, swelling in legs. She had a PE in September 2019 and now takes Xarelto- sees Dr Oc Mclean (hematologist)- dosage was cut in half. (Her HCTZ was stopped in the hospital, we then increased Losartan 50mg to 100mg- was stopped as BP were still high)- now takes Valsartan 320 mg in am, (her cardiologist stopped Metoprolol 25 mg plus 50mg bid ) and takes Carvedilol 25 mg bid. She tries to avoid salt in her diet, cooks at home, has lost about 2 lbs in the last 6 months, she is walking 2 times a week for about 30 mins, has cut back on portion sizes. Last eye exam was in the fall of 2022- goes every 2 years. Review of Systems   Constitutional:  Negative for chills and fever. HENT:  Negative for congestion and sore throat. Eyes:  Negative for photophobia and visual disturbance. Respiratory:  Negative for cough, shortness of breath and wheezing. Cardiovascular:  Negative for chest pain, palpitations and leg swelling. Gastrointestinal:  Negative for abdominal pain, diarrhea, nausea and vomiting. Skin:  Negative for color change and rash. Neurological:  Negative for weakness and numbness. Objective   Physical Exam  Vitals and nursing note reviewed. Constitutional:       General: She is not in acute distress. Appearance: She is obese. HENT:      Mouth/Throat:      Mouth: Mucous membranes are moist.      Pharynx: Oropharynx is clear. Eyes:      Conjunctiva/sclera: Conjunctivae normal.      Pupils: Pupils are equal, round, and reactive to light. Cardiovascular:      Rate and Rhythm: Normal rate and regular rhythm. Pulmonary:      Effort: Pulmonary effort is normal.      Breath sounds: Normal breath sounds. No wheezing. Abdominal:      General: Bowel sounds are normal. There is no distension. Palpations: Abdomen is soft. Tenderness: There is no abdominal tenderness. Musculoskeletal:      Cervical back: Neck supple. Right lower leg: No edema. Left lower leg: No edema. Lymphadenopathy:      Cervical: No cervical adenopathy. Skin:     General: Skin is warm and dry. Findings: No rash. Neurological:      Mental Status: She is alert. An electronic signature was used to authenticate this note.     --Jada Thompson MD

## 2023-02-24 ENCOUNTER — TELEPHONE (OUTPATIENT)
Dept: FAMILY MEDICINE CLINIC | Facility: CLINIC | Age: 78
End: 2023-02-24

## 2023-02-24 NOTE — TELEPHONE ENCOUNTER
----- Message from [facility] sent at 2/23/2023  9:38 AM EST -----  Subject: Message to Provider    QUESTIONS  Information for Provider? Pt needs to schedule a lab appointment for Lipid   Panel. Please call to schedule an appt when the  is back. ---------------------------------------------------------------------------  --------------  Mary STARK  [phone]; Do not leave any message, patient will call back for answer  ---------------------------------------------------------------------------  --------------  SCRIPT ANSWERS  Relationship to Patient?  Self

## 2023-02-24 NOTE — TELEPHONE ENCOUNTER
Called patient to schedule a lab only appointment was instructed not to leave a voicemail that patient would call back for an answer.

## 2023-03-29 ENCOUNTER — HOSPITAL ENCOUNTER (OUTPATIENT)
Dept: LAB | Age: 78
Discharge: HOME | End: 2023-04-01
Payer: MEDICARE

## 2023-03-29 DIAGNOSIS — Z79.01 LONG TERM (CURRENT) USE OF ANTICOAGULANTS: ICD-10-CM

## 2023-03-29 DIAGNOSIS — Z86.711 PERSONAL HISTORY OF PULMONARY EMBOLISM: ICD-10-CM

## 2023-03-29 LAB
ALBUMIN SERPL-MCNC: 3.6 G/DL (ref 3.2–4.6)
ALBUMIN/GLOB SERPL: 0.9 (ref 0.4–1.6)
ALP SERPL-CCNC: 131 U/L (ref 50–136)
ALT SERPL-CCNC: 22 U/L (ref 12–65)
ANION GAP SERPL CALC-SCNC: 4 MMOL/L (ref 2–11)
AST SERPL-CCNC: 15 U/L (ref 15–37)
BASOPHILS # BLD: 0 K/UL (ref 0–0.2)
BASOPHILS NFR BLD: 1 % (ref 0–2)
BILIRUB SERPL-MCNC: 0.7 MG/DL (ref 0.2–1.1)
BUN SERPL-MCNC: 13 MG/DL (ref 8–23)
CALCIUM SERPL-MCNC: 8.9 MG/DL (ref 8.3–10.4)
CHLORIDE SERPL-SCNC: 111 MMOL/L (ref 101–110)
CO2 SERPL-SCNC: 28 MMOL/L (ref 21–32)
CREAT SERPL-MCNC: 0.9 MG/DL (ref 0.6–1)
D DIMER PPP FEU-MCNC: <0.27 UG/ML(FEU)
DIFFERENTIAL METHOD BLD: NORMAL
EOSINOPHIL # BLD: 0.2 K/UL (ref 0–0.8)
EOSINOPHIL NFR BLD: 4 % (ref 0.5–7.8)
ERYTHROCYTE [DISTWIDTH] IN BLOOD BY AUTOMATED COUNT: 14.3 % (ref 11.9–14.6)
GLOBULIN SER CALC-MCNC: 3.9 G/DL (ref 2.8–4.5)
GLUCOSE SERPL-MCNC: 101 MG/DL (ref 65–100)
HCT VFR BLD AUTO: 39.4 % (ref 35.8–46.3)
HGB BLD-MCNC: 12.8 G/DL (ref 11.7–15.4)
IMM GRANULOCYTES # BLD AUTO: 0 K/UL (ref 0–0.5)
IMM GRANULOCYTES NFR BLD AUTO: 0 % (ref 0–5)
LYMPHOCYTES # BLD: 2.5 K/UL (ref 0.5–4.6)
LYMPHOCYTES NFR BLD: 38 % (ref 13–44)
MCH RBC QN AUTO: 29.2 PG (ref 26.1–32.9)
MCHC RBC AUTO-ENTMCNC: 32.5 G/DL (ref 31.4–35)
MCV RBC AUTO: 89.7 FL (ref 82–102)
MONOCYTES # BLD: 0.6 K/UL (ref 0.1–1.3)
MONOCYTES NFR BLD: 8 % (ref 4–12)
NEUTS SEG # BLD: 3.3 K/UL (ref 1.7–8.2)
NEUTS SEG NFR BLD: 49 % (ref 43–78)
NRBC # BLD: 0 K/UL (ref 0–0.2)
PLATELET # BLD AUTO: 232 K/UL (ref 150–450)
PMV BLD AUTO: 10.4 FL (ref 9.4–12.3)
POTASSIUM SERPL-SCNC: 3.4 MMOL/L (ref 3.5–5.1)
PROT SERPL-MCNC: 7.5 G/DL (ref 6.3–8.2)
RBC # BLD AUTO: 4.39 M/UL (ref 4.05–5.2)
SODIUM SERPL-SCNC: 143 MMOL/L (ref 133–143)
WBC # BLD AUTO: 6.6 K/UL (ref 4.3–11.1)

## 2023-03-29 PROCEDURE — 85379 FIBRIN DEGRADATION QUANT: CPT

## 2023-03-29 PROCEDURE — 85025 COMPLETE CBC W/AUTO DIFF WBC: CPT

## 2023-03-29 PROCEDURE — 80053 COMPREHEN METABOLIC PANEL: CPT

## 2023-03-29 PROCEDURE — 36415 COLL VENOUS BLD VENIPUNCTURE: CPT

## 2023-04-04 ENCOUNTER — OFFICE VISIT (OUTPATIENT)
Dept: ONCOLOGY | Age: 78
End: 2023-04-04
Payer: MEDICARE

## 2023-04-04 VITALS
OXYGEN SATURATION: 98 % | BODY MASS INDEX: 31.7 KG/M2 | DIASTOLIC BLOOD PRESSURE: 75 MMHG | HEART RATE: 71 BPM | WEIGHT: 151 LBS | SYSTOLIC BLOOD PRESSURE: 159 MMHG | TEMPERATURE: 98.7 F | HEIGHT: 58 IN | RESPIRATION RATE: 22 BRPM

## 2023-04-04 DIAGNOSIS — Z79.01 LONG TERM (CURRENT) USE OF ANTICOAGULANTS: Primary | ICD-10-CM

## 2023-04-04 DIAGNOSIS — Z86.711 PERSONAL HISTORY OF PULMONARY EMBOLISM: ICD-10-CM

## 2023-04-04 PROCEDURE — 1090F PRES/ABSN URINE INCON ASSESS: CPT | Performed by: NURSE PRACTITIONER

## 2023-04-04 PROCEDURE — 1036F TOBACCO NON-USER: CPT | Performed by: NURSE PRACTITIONER

## 2023-04-04 PROCEDURE — G8417 CALC BMI ABV UP PARAM F/U: HCPCS | Performed by: NURSE PRACTITIONER

## 2023-04-04 PROCEDURE — 3078F DIAST BP <80 MM HG: CPT | Performed by: NURSE PRACTITIONER

## 2023-04-04 PROCEDURE — G8427 DOCREV CUR MEDS BY ELIG CLIN: HCPCS | Performed by: NURSE PRACTITIONER

## 2023-04-04 PROCEDURE — G8400 PT W/DXA NO RESULTS DOC: HCPCS | Performed by: NURSE PRACTITIONER

## 2023-04-04 PROCEDURE — 99214 OFFICE O/P EST MOD 30 MIN: CPT | Performed by: NURSE PRACTITIONER

## 2023-04-04 PROCEDURE — 1123F ACP DISCUSS/DSCN MKR DOCD: CPT | Performed by: NURSE PRACTITIONER

## 2023-04-04 PROCEDURE — 3077F SYST BP >= 140 MM HG: CPT | Performed by: NURSE PRACTITIONER

## 2023-04-04 ASSESSMENT — PATIENT HEALTH QUESTIONNAIRE - PHQ9
SUM OF ALL RESPONSES TO PHQ QUESTIONS 1-9: 0
1. LITTLE INTEREST OR PLEASURE IN DOING THINGS: 0
SUM OF ALL RESPONSES TO PHQ QUESTIONS 1-9: 0
SUM OF ALL RESPONSES TO PHQ9 QUESTIONS 1 & 2: 0
2. FEELING DOWN, DEPRESSED OR HOPELESS: 0

## 2023-04-04 NOTE — LETTER
April 4, 2023      Azra Taveras MD  [address] [address]      Patient: Nely Shaikh   MR Number: [id number]   YOB: 1945   Date of Visit: 4/4/2023       Dear Azra Taveras:    Thank you for referring Erin Hurtado to me for evaluation/treatment. Below are the relevant portions of my assessment and plan of care. If you have questions, please do not hesitate to call me. I look forward to following Larisa along with you.     Sincerely,        CALLIE Ellington

## 2023-04-04 NOTE — PROGRESS NOTES
no peripheral edema. Skin: Normal skin tone with no rash, petechiae, ecchymosis noted. Musculoskeletal: No pain on palpation over bony prominence, no edema, no evidence of gout, no joint or bony deformity  Neurologic: Grossly intact        LABS/IMAGING:    Lab Results   Component Value Date/Time    WBC 6.6 03/29/2023 11:37 AM    HGB 12.8 03/29/2023 11:37 AM    HCT 39.4 03/29/2023 11:37 AM     03/29/2023 11:37 AM    MCV 89.7 03/29/2023 11:37 AM       Lab Results   Component Value Date/Time     03/29/2023 11:37 AM    K 3.4 03/29/2023 11:37 AM     03/29/2023 11:37 AM    CO2 28 03/29/2023 11:37 AM    BUN 13 03/29/2023 11:37 AM    GFRAA >60 09/28/2022 11:06 AM    GLOB 3.9 03/29/2023 11:37 AM    ALT 22 03/29/2023 11:37 AM           Above results reviewed with patient. ASSESSMENT:  74F textile , non-smoker, denies any HRT use h/o KIAN, hypertension, dyslipidemia, allergic rhinitis, cholecystectomy more recently admitted via [facility] ED where she presented with cough x 2-3 weeks and right sided chest discomfort x 1 day. CTA chest in ED 9/21/2019 had shown RLL segmental PE with small infiltrates versus early pulmonary infarcts at the right base. She was hospitalized from 9/21/2019 to 9/23/2019. Echo without RT ventricular strain. She was discharged on rivaroxaban. She subsequently represented to ED 9/29/2019 with facial itching. Per ED note, no tongue swelling, breathing difficulties, or stridor noted. Patient was felt to have allergic reaction to possibly rivaroxaban versus recently changed blood pressure or cough medications, advised Benadryl as needed. She is now seen by us in terms of anticoagulation recommendation. Patient reports family history significant for mother having PE in her 80s. Denies any other personal history of thrombophilia or miscarriages.  Denies any prolonged travel, hospitalizations, prolonged immobilization, or trauma to extremities in the 6 months prior to

## 2023-04-04 NOTE — PATIENT INSTRUCTIONS
03/29/2023 4  2 - 11 mmol/L Final    Glucose 03/29/2023 101 (H)  65 - 100 mg/dL Final    BUN 03/29/2023 13  8 - 23 MG/DL Final    Creatinine 03/29/2023 0.90  0.6 - 1.0 MG/DL Final    EstMarsha Filt Rate 03/29/2023 >60  >60 ml/min/1.73m2 Final    Comment:      Pediatric calculator link: [employer].at. org/professionals/kdoqi/gfr_calculatorped       These results are not intended for use in patients <25years of age. eGFR results are calculated without a race factor using  the 2021 CKD-EPI equation. Careful clinical correlation is recommended, particularly when comparing to results calculated using previous equations. The CKD-EPI equation is less accurate in patients with extremes of muscle mass, extra-renal metabolism of creatinine, excessive creatine ingestion, or following therapy that affects renal tubular secretion. Calcium 03/29/2023 8.9  8.3 - 10.4 MG/DL Final    Total Bilirubin 03/29/2023 0.7  0.2 - 1.1 MG/DL Final    ALT 03/29/2023 22  12 - 65 U/L Final    AST 03/29/2023 15  15 - 37 U/L Final    Alk Phosphatase 03/29/2023 131  50 - 136 U/L Final    Total Protein 03/29/2023 7.5  6.3 - 8.2 g/dL Final    Albumin 03/29/2023 3.6  3.2 - 4.6 g/dL Final    Globulin 03/29/2023 3.9  2.8 - 4.5 g/dL Final    Albumin/Globulin Ratio 03/29/2023 0.9  0.4 - 1.6   Final    D-Dimer, Quant 03/29/2023 <0.27  <0.56 ug/ml(FEU) Final    Comment: (NOTE)  A D-Dimer result less than 0.5 ug/mL FEU combined with a low clinical   pretest probability of DVT and/or PE has a negative predictive value   of %. The positive predictive value is 50% or less.            Treatment Summary has been discussed and given to patient: n/a        -------------------------------------------------------------------------------------------------------------------  Please call our office at [phone] if you have any  of the following symptoms:   Fever of 100.5 or greater  Chills  Shortness of breath  Swelling or pain in one

## 2023-05-22 ENCOUNTER — TELEPHONE (OUTPATIENT)
Dept: FAMILY MEDICINE CLINIC | Facility: CLINIC | Age: 78
End: 2023-05-22

## 2023-05-22 DIAGNOSIS — R21 RASH AND OTHER NONSPECIFIC SKIN ERUPTION: ICD-10-CM

## 2023-05-22 RX ORDER — HYDROXYZINE HYDROCHLORIDE 25 MG/1
25 TABLET, FILM COATED ORAL EVERY 8 HOURS PRN
Qty: 60 TABLET | Refills: 0 | Status: SHIPPED | OUTPATIENT
Start: 2023-05-22

## 2023-08-14 ENCOUNTER — OFFICE VISIT (OUTPATIENT)
Dept: FAMILY MEDICINE CLINIC | Facility: CLINIC | Age: 78
End: 2023-08-14
Payer: MEDICARE

## 2023-08-14 VITALS
HEART RATE: 67 BPM | HEIGHT: 58 IN | DIASTOLIC BLOOD PRESSURE: 78 MMHG | TEMPERATURE: 97.3 F | BODY MASS INDEX: 32.54 KG/M2 | RESPIRATION RATE: 16 BRPM | SYSTOLIC BLOOD PRESSURE: 130 MMHG | WEIGHT: 155 LBS | OXYGEN SATURATION: 97 %

## 2023-08-14 DIAGNOSIS — I10 ESSENTIAL HYPERTENSION: ICD-10-CM

## 2023-08-14 DIAGNOSIS — E66.9 OBESITY (BMI 30-39.9): ICD-10-CM

## 2023-08-14 DIAGNOSIS — E78.2 MIXED HYPERLIPIDEMIA: Primary | ICD-10-CM

## 2023-08-14 DIAGNOSIS — E78.2 MIXED HYPERLIPIDEMIA: ICD-10-CM

## 2023-08-14 LAB
ALBUMIN SERPL-MCNC: 3.5 G/DL (ref 3.2–4.6)
ALBUMIN/GLOB SERPL: 0.8 (ref 0.4–1.6)
ALP SERPL-CCNC: 159 U/L (ref 50–136)
ALT SERPL-CCNC: 25 U/L (ref 12–65)
ANION GAP SERPL CALC-SCNC: 8 MMOL/L (ref 2–11)
AST SERPL-CCNC: 17 U/L (ref 15–37)
BILIRUB SERPL-MCNC: 0.5 MG/DL (ref 0.2–1.1)
BUN SERPL-MCNC: 15 MG/DL (ref 8–23)
CALCIUM SERPL-MCNC: 9.1 MG/DL (ref 8.3–10.4)
CHLORIDE SERPL-SCNC: 111 MMOL/L (ref 101–110)
CHOLEST SERPL-MCNC: 127 MG/DL
CO2 SERPL-SCNC: 26 MMOL/L (ref 21–32)
CREAT SERPL-MCNC: 1 MG/DL (ref 0.6–1)
GLOBULIN SER CALC-MCNC: 4.2 G/DL (ref 2.8–4.5)
GLUCOSE SERPL-MCNC: 104 MG/DL (ref 65–100)
HDLC SERPL-MCNC: 58 MG/DL (ref 40–60)
HDLC SERPL: 2.2
LDLC SERPL CALC-MCNC: 56.4 MG/DL
POTASSIUM SERPL-SCNC: 3.9 MMOL/L (ref 3.5–5.1)
PROT SERPL-MCNC: 7.7 G/DL (ref 6.3–8.2)
SODIUM SERPL-SCNC: 145 MMOL/L (ref 133–143)
TRIGL SERPL-MCNC: 63 MG/DL (ref 35–150)
VLDLC SERPL CALC-MCNC: 12.6 MG/DL (ref 6–23)

## 2023-08-14 PROCEDURE — 3078F DIAST BP <80 MM HG: CPT | Performed by: FAMILY MEDICINE

## 2023-08-14 PROCEDURE — 1036F TOBACCO NON-USER: CPT | Performed by: FAMILY MEDICINE

## 2023-08-14 PROCEDURE — 3075F SYST BP GE 130 - 139MM HG: CPT | Performed by: FAMILY MEDICINE

## 2023-08-14 PROCEDURE — G8417 CALC BMI ABV UP PARAM F/U: HCPCS | Performed by: FAMILY MEDICINE

## 2023-08-14 PROCEDURE — 1090F PRES/ABSN URINE INCON ASSESS: CPT | Performed by: FAMILY MEDICINE

## 2023-08-14 PROCEDURE — G8400 PT W/DXA NO RESULTS DOC: HCPCS | Performed by: FAMILY MEDICINE

## 2023-08-14 PROCEDURE — 99214 OFFICE O/P EST MOD 30 MIN: CPT | Performed by: FAMILY MEDICINE

## 2023-08-14 PROCEDURE — 1123F ACP DISCUSS/DSCN MKR DOCD: CPT | Performed by: FAMILY MEDICINE

## 2023-08-14 PROCEDURE — G8427 DOCREV CUR MEDS BY ELIG CLIN: HCPCS | Performed by: FAMILY MEDICINE

## 2023-08-14 RX ORDER — ATORVASTATIN CALCIUM 20 MG/1
20 TABLET, FILM COATED ORAL DAILY
Qty: 90 TABLET | Refills: 1 | Status: SHIPPED | OUTPATIENT
Start: 2023-08-14

## 2023-08-14 RX ORDER — HYDROXYZINE HYDROCHLORIDE 25 MG/1
25 TABLET, FILM COATED ORAL EVERY 8 HOURS PRN
Qty: 60 TABLET | Refills: 0 | Status: CANCELLED | OUTPATIENT
Start: 2023-08-14

## 2023-08-14 RX ORDER — VALSARTAN 320 MG/1
320 TABLET ORAL DAILY
Qty: 90 TABLET | Refills: 1 | Status: SHIPPED | OUTPATIENT
Start: 2023-08-14

## 2023-08-14 RX ORDER — CARVEDILOL 25 MG/1
25 TABLET ORAL 2 TIMES DAILY WITH MEALS
Qty: 180 TABLET | Refills: 1 | Status: SHIPPED | OUTPATIENT
Start: 2023-08-14

## 2023-08-14 ASSESSMENT — PATIENT HEALTH QUESTIONNAIRE - PHQ9
SUM OF ALL RESPONSES TO PHQ QUESTIONS 1-9: 0
1. LITTLE INTEREST OR PLEASURE IN DOING THINGS: 0
SUM OF ALL RESPONSES TO PHQ QUESTIONS 1-9: 0
SUM OF ALL RESPONSES TO PHQ QUESTIONS 1-9: 0
SUM OF ALL RESPONSES TO PHQ9 QUESTIONS 1 & 2: 0
SUM OF ALL RESPONSES TO PHQ QUESTIONS 1-9: 0
2. FEELING DOWN, DEPRESSED OR HOPELESS: 0

## 2023-08-14 ASSESSMENT — ENCOUNTER SYMPTOMS
ABDOMINAL PAIN: 0
COUGH: 0
NAUSEA: 0
DIARRHEA: 0
SHORTNESS OF BREATH: 0
VOMITING: 0
WHEEZING: 0
PHOTOPHOBIA: 0

## 2023-08-14 NOTE — PROGRESS NOTES
HENT:      Mouth/Throat:      Mouth: Mucous membranes are moist.      Pharynx: Oropharynx is clear. Eyes:      Conjunctiva/sclera: Conjunctivae normal.      Pupils: Pupils are equal, round, and reactive to light. Cardiovascular:      Rate and Rhythm: Normal rate and regular rhythm. Pulmonary:      Effort: Pulmonary effort is normal. No respiratory distress. Breath sounds: Normal breath sounds. Abdominal:      General: Bowel sounds are normal.      Palpations: Abdomen is soft. Tenderness: There is no abdominal tenderness. Musculoskeletal:      Cervical back: Neck supple. No tenderness. Right lower leg: No edema. Left lower leg: No edema. Neurological:      Mental Status: She is alert. An electronic signature was used to authenticate this note.     --Landry Cheadle, MD

## 2023-10-11 ENCOUNTER — HOSPITAL ENCOUNTER (OUTPATIENT)
Dept: LAB | Age: 78
Discharge: HOME | End: 2023-10-14
Payer: MEDICARE

## 2023-10-11 ENCOUNTER — OFFICE VISIT (OUTPATIENT)
Dept: ONCOLOGY | Age: 78
End: 2023-10-11
Payer: MEDICARE

## 2023-10-11 VITALS
HEIGHT: 58 IN | TEMPERATURE: 98.3 F | OXYGEN SATURATION: 97 % | WEIGHT: 150.9 LBS | RESPIRATION RATE: 16 BRPM | DIASTOLIC BLOOD PRESSURE: 88 MMHG | HEART RATE: 60 BPM | BODY MASS INDEX: 31.68 KG/M2 | SYSTOLIC BLOOD PRESSURE: 190 MMHG

## 2023-10-11 DIAGNOSIS — Z79.01 LONG TERM (CURRENT) USE OF ANTICOAGULANTS: ICD-10-CM

## 2023-10-11 DIAGNOSIS — Z86.711 PERSONAL HISTORY OF PULMONARY EMBOLISM: ICD-10-CM

## 2023-10-11 DIAGNOSIS — Z86.711 PERSONAL HISTORY OF PULMONARY EMBOLISM: Primary | ICD-10-CM

## 2023-10-11 LAB
ALBUMIN SERPL-MCNC: 3.4 G/DL (ref 3.2–4.6)
ALBUMIN/GLOB SERPL: 0.8 (ref 0.4–1.6)
ALP SERPL-CCNC: 172 U/L (ref 50–136)
ALT SERPL-CCNC: 26 U/L (ref 12–65)
ANION GAP SERPL CALC-SCNC: 6 MMOL/L (ref 2–11)
AST SERPL-CCNC: 17 U/L (ref 15–37)
BASOPHILS # BLD: 0 K/UL (ref 0–0.2)
BASOPHILS NFR BLD: 0 % (ref 0–2)
BILIRUB SERPL-MCNC: 0.6 MG/DL (ref 0.2–1.1)
BUN SERPL-MCNC: 14 MG/DL (ref 8–23)
CALCIUM SERPL-MCNC: 9 MG/DL (ref 8.3–10.4)
CHLORIDE SERPL-SCNC: 109 MMOL/L (ref 101–110)
CO2 SERPL-SCNC: 28 MMOL/L (ref 21–32)
CREAT SERPL-MCNC: 1 MG/DL (ref 0.6–1)
D DIMER PPP FEU-MCNC: <0.27 UG/ML(FEU)
DIFFERENTIAL METHOD BLD: NORMAL
EOSINOPHIL # BLD: 0.4 K/UL (ref 0–0.8)
EOSINOPHIL NFR BLD: 6 % (ref 0.5–7.8)
ERYTHROCYTE [DISTWIDTH] IN BLOOD BY AUTOMATED COUNT: 14.6 % (ref 11.9–14.6)
GLOBULIN SER CALC-MCNC: 4.2 G/DL (ref 2.8–4.5)
GLUCOSE SERPL-MCNC: 110 MG/DL (ref 65–100)
HCT VFR BLD AUTO: 40.4 % (ref 35.8–46.3)
HGB BLD-MCNC: 13.1 G/DL (ref 11.7–15.4)
IMM GRANULOCYTES # BLD AUTO: 0 K/UL (ref 0–0.5)
IMM GRANULOCYTES NFR BLD AUTO: 0 % (ref 0–5)
LYMPHOCYTES # BLD: 2.2 K/UL (ref 0.5–4.6)
LYMPHOCYTES NFR BLD: 32 % (ref 13–44)
MCH RBC QN AUTO: 29.4 PG (ref 26.1–32.9)
MCHC RBC AUTO-ENTMCNC: 32.4 G/DL (ref 31.4–35)
MCV RBC AUTO: 90.6 FL (ref 82–102)
MONOCYTES # BLD: 0.7 K/UL (ref 0.1–1.3)
MONOCYTES NFR BLD: 10 % (ref 4–12)
NEUTS SEG # BLD: 3.6 K/UL (ref 1.7–8.2)
NEUTS SEG NFR BLD: 52 % (ref 43–78)
NRBC # BLD: 0 K/UL (ref 0–0.2)
PLATELET # BLD AUTO: 220 K/UL (ref 150–450)
PMV BLD AUTO: 10.8 FL (ref 9.4–12.3)
POTASSIUM SERPL-SCNC: 3.8 MMOL/L (ref 3.5–5.1)
PROT SERPL-MCNC: 7.6 G/DL (ref 6.3–8.2)
RBC # BLD AUTO: 4.46 M/UL (ref 4.05–5.2)
SODIUM SERPL-SCNC: 143 MMOL/L (ref 133–143)
WBC # BLD AUTO: 7 K/UL (ref 4.3–11.1)

## 2023-10-11 PROCEDURE — G8484 FLU IMMUNIZE NO ADMIN: HCPCS | Performed by: INTERNAL MEDICINE

## 2023-10-11 PROCEDURE — 36415 COLL VENOUS BLD VENIPUNCTURE: CPT

## 2023-10-11 PROCEDURE — 1036F TOBACCO NON-USER: CPT | Performed by: INTERNAL MEDICINE

## 2023-10-11 PROCEDURE — G8427 DOCREV CUR MEDS BY ELIG CLIN: HCPCS | Performed by: INTERNAL MEDICINE

## 2023-10-11 PROCEDURE — 99214 OFFICE O/P EST MOD 30 MIN: CPT | Performed by: INTERNAL MEDICINE

## 2023-10-11 PROCEDURE — 85025 COMPLETE CBC W/AUTO DIFF WBC: CPT

## 2023-10-11 PROCEDURE — G8417 CALC BMI ABV UP PARAM F/U: HCPCS | Performed by: INTERNAL MEDICINE

## 2023-10-11 PROCEDURE — 85379 FIBRIN DEGRADATION QUANT: CPT

## 2023-10-11 PROCEDURE — 1123F ACP DISCUSS/DSCN MKR DOCD: CPT | Performed by: INTERNAL MEDICINE

## 2023-10-11 PROCEDURE — G8400 PT W/DXA NO RESULTS DOC: HCPCS | Performed by: INTERNAL MEDICINE

## 2023-10-11 PROCEDURE — 3079F DIAST BP 80-89 MM HG: CPT | Performed by: INTERNAL MEDICINE

## 2023-10-11 PROCEDURE — 1090F PRES/ABSN URINE INCON ASSESS: CPT | Performed by: INTERNAL MEDICINE

## 2023-10-11 PROCEDURE — 3077F SYST BP >= 140 MM HG: CPT | Performed by: INTERNAL MEDICINE

## 2023-10-11 PROCEDURE — 80053 COMPREHEN METABOLIC PANEL: CPT

## 2023-10-11 ASSESSMENT — PATIENT HEALTH QUESTIONNAIRE - PHQ9
SUM OF ALL RESPONSES TO PHQ9 QUESTIONS 1 & 2: 0
SUM OF ALL RESPONSES TO PHQ QUESTIONS 1-9: 0
1. LITTLE INTEREST OR PLEASURE IN DOING THINGS: 0
SUM OF ALL RESPONSES TO PHQ QUESTIONS 1-9: 0
2. FEELING DOWN, DEPRESSED OR HOPELESS: 0

## 2023-10-12 ENCOUNTER — OFFICE VISIT (OUTPATIENT)
Dept: FAMILY MEDICINE CLINIC | Facility: CLINIC | Age: 78
End: 2023-10-12
Payer: MEDICARE

## 2023-10-12 VITALS
HEART RATE: 56 BPM | RESPIRATION RATE: 16 BRPM | BODY MASS INDEX: 31.91 KG/M2 | OXYGEN SATURATION: 96 % | DIASTOLIC BLOOD PRESSURE: 88 MMHG | SYSTOLIC BLOOD PRESSURE: 138 MMHG | TEMPERATURE: 97 F | HEIGHT: 58 IN | WEIGHT: 152 LBS

## 2023-10-12 DIAGNOSIS — Z23 ENCOUNTER FOR IMMUNIZATION: ICD-10-CM

## 2023-10-12 DIAGNOSIS — Z00.00 MEDICARE ANNUAL WELLNESS VISIT, SUBSEQUENT: Primary | ICD-10-CM

## 2023-10-12 PROCEDURE — 1123F ACP DISCUSS/DSCN MKR DOCD: CPT | Performed by: FAMILY MEDICINE

## 2023-10-12 PROCEDURE — G0439 PPPS, SUBSEQ VISIT: HCPCS | Performed by: FAMILY MEDICINE

## 2023-10-12 PROCEDURE — 3079F DIAST BP 80-89 MM HG: CPT | Performed by: FAMILY MEDICINE

## 2023-10-12 PROCEDURE — 90694 VACC AIIV4 NO PRSRV 0.5ML IM: CPT | Performed by: FAMILY MEDICINE

## 2023-10-12 PROCEDURE — G0008 ADMIN INFLUENZA VIRUS VAC: HCPCS | Performed by: FAMILY MEDICINE

## 2023-10-12 PROCEDURE — 3075F SYST BP GE 130 - 139MM HG: CPT | Performed by: FAMILY MEDICINE

## 2023-10-12 PROCEDURE — G8484 FLU IMMUNIZE NO ADMIN: HCPCS | Performed by: FAMILY MEDICINE

## 2023-10-12 RX ORDER — ZOSTER VACCINE RECOMBINANT, ADJUVANTED 50 MCG/0.5
KIT INTRAMUSCULAR
Qty: 0.5 ML | Refills: 1 | Status: SHIPPED | OUTPATIENT
Start: 2023-10-12

## 2023-10-12 ASSESSMENT — PATIENT HEALTH QUESTIONNAIRE - PHQ9
SUM OF ALL RESPONSES TO PHQ QUESTIONS 1-9: 0
2. FEELING DOWN, DEPRESSED OR HOPELESS: 0
SUM OF ALL RESPONSES TO PHQ QUESTIONS 1-9: 0
1. LITTLE INTEREST OR PLEASURE IN DOING THINGS: 0
SUM OF ALL RESPONSES TO PHQ9 QUESTIONS 1 & 2: 0

## 2023-10-12 ASSESSMENT — LIFESTYLE VARIABLES
HOW OFTEN DO YOU HAVE A DRINK CONTAINING ALCOHOL: NEVER
HOW MANY STANDARD DRINKS CONTAINING ALCOHOL DO YOU HAVE ON A TYPICAL DAY: PATIENT DOES NOT DRINK

## 2023-10-12 NOTE — PROGRESS NOTES
meals) Yes Lio Kimbrough MD   valsartan (DIOVAN) 320 MG tablet Take 1 tablet by mouth daily Yes Lio Kimbrough MD   Multiple Vitamin (MULTIVITAMIN ADULT PO) Take 1 tablet by mouth daily Yes Provider, MD Micheline Birch (Including outside providers/suppliers regularly involved in providing care):   Patient Care Team:  Lio Kimbrough MD as PCP - Rachel Weber MD as PCP - Empaneled Provider     Reviewed and updated this visit:  Tobacco  Allergies  Meds  Problems  Med Hx  Surg Hx  Soc Hx  Fam Hx

## 2023-10-12 NOTE — ACP (ADVANCE CARE PLANNING)
Date of ACP Conversation: 10/12/23  Persons included in Conversation: Patient  Length of ACP Conversation in minutes: 5 minutes    Authorized Decision Maker (if patient is incapable of making informed decisions): NA          For Patients with Decision Making Capacity:   Patient does not have a Living Will, Advance Directives or [address]. Conversation Outcomes / Follow-Up Plan:   Advised patient to make a Living Will, Advance Directives and a Healthcare Power of . Also advised patient to bring a copy of the same to their chart - patient understands and agrees.

## 2023-10-12 NOTE — PATIENT INSTRUCTIONS

## 2024-02-15 ENCOUNTER — OFFICE VISIT (OUTPATIENT)
Dept: FAMILY MEDICINE CLINIC | Facility: CLINIC | Age: 79
End: 2024-02-15
Payer: MEDICARE

## 2024-02-15 VITALS
WEIGHT: 155 LBS | HEIGHT: 58 IN | OXYGEN SATURATION: 97 % | DIASTOLIC BLOOD PRESSURE: 80 MMHG | SYSTOLIC BLOOD PRESSURE: 130 MMHG | BODY MASS INDEX: 32.54 KG/M2 | HEART RATE: 66 BPM | RESPIRATION RATE: 17 BRPM

## 2024-02-15 DIAGNOSIS — R21 RASH AND NONSPECIFIC SKIN ERUPTION: ICD-10-CM

## 2024-02-15 DIAGNOSIS — E66.9 OBESITY (BMI 30-39.9): ICD-10-CM

## 2024-02-15 DIAGNOSIS — E78.2 MIXED HYPERLIPIDEMIA: Primary | ICD-10-CM

## 2024-02-15 DIAGNOSIS — I27.82 OTHER CHRONIC PULMONARY EMBOLISM WITHOUT ACUTE COR PULMONALE (HCC): ICD-10-CM

## 2024-02-15 DIAGNOSIS — I10 ESSENTIAL HYPERTENSION: ICD-10-CM

## 2024-02-15 PROCEDURE — 3079F DIAST BP 80-89 MM HG: CPT | Performed by: FAMILY MEDICINE

## 2024-02-15 PROCEDURE — 1123F ACP DISCUSS/DSCN MKR DOCD: CPT | Performed by: FAMILY MEDICINE

## 2024-02-15 PROCEDURE — 99214 OFFICE O/P EST MOD 30 MIN: CPT | Performed by: FAMILY MEDICINE

## 2024-02-15 PROCEDURE — 3075F SYST BP GE 130 - 139MM HG: CPT | Performed by: FAMILY MEDICINE

## 2024-02-15 RX ORDER — BENZOCAINE/MENTHOL 6 MG-10 MG
LOZENGE MUCOUS MEMBRANE
Qty: 30 G | Refills: 1 | Status: SHIPPED | OUTPATIENT
Start: 2024-02-15 | End: 2024-02-22

## 2024-02-15 RX ORDER — ATORVASTATIN CALCIUM 20 MG/1
20 TABLET, FILM COATED ORAL DAILY
Qty: 90 TABLET | Refills: 1 | Status: SHIPPED | OUTPATIENT
Start: 2024-02-15

## 2024-02-15 RX ORDER — VALSARTAN 320 MG/1
320 TABLET ORAL DAILY
Qty: 90 TABLET | Refills: 1 | Status: SHIPPED | OUTPATIENT
Start: 2024-02-15

## 2024-02-15 RX ORDER — CARVEDILOL 25 MG/1
25 TABLET ORAL 2 TIMES DAILY WITH MEALS
Qty: 180 TABLET | Refills: 1 | Status: SHIPPED | OUTPATIENT
Start: 2024-02-15

## 2024-02-15 NOTE — PATIENT INSTRUCTIONS
[place] [employer]*  (Call 211 if you need more resources.)    Meals on Wheels  They offer: Meal delivery for eligible individuals.  Contact: [phone]    [place] [employer]  They offer: Fresh food boxes. $5 for SNAP/EBT persons, $15 for all others.  Contact: [email or web address] (must preorder from site).   Helpful Info: Pickup every other Wednesday 11am-6pm at [address] [facility] [address] [facility], [place] [address] [place] [address] Food Pantry  They offer: Groceries/food.  Contact: [phone]; [address] [place] [address]  Helpful Info: Open Thursday 8am-12pm.    Levine, Susan. \[facility].\"" Food Pantry  They offer: Groceries/food.  Contact: [phone]; [address] [place] [address]  Helpful Info: Open 8am-5pm Monday-Thursdays, 8am-12pm Fridays.    [employer] [facility]  They offer: Groceries/food.  Contact: [phone]; [address] [place] [address]  Helpful Info: Must call for hours and availability.     [facility] Pant[facility]  They offer: Groceries/food.  Contact: [phone]  Helpful Info: Call for hours and availability.     [facility] Food [facility]  They offer: Emergency food.  Contact: [phone]; [address] [place] [address]  Helpful Info: Hours are Monday, Wednesday, and Friday 9am-1pm.     [employer]  They offer: Groceries/food.  Contact: [phone]; [address] [place] [address]  Helpful Info: Call for hours and availability.     [facility] Food Pantry  They offer: Groceries/food.  Contact: [phone]; [address] [place] [address]  Helpful Info: Call for hours and availability. [place] [employer]*  (Call 211 if you need more resources.)    [place] [employer]  They Offer: Housing Choice Vouchers (Section 8) rental assistance available to persons with disabilities, families with children, and older adults whose household income is below 80%

## 2024-02-15 NOTE — PROGRESS NOTES
Dariela Sebastian (:  1945) is a 79 y.o. female,Established patient, here for evaluation of the following chief complaint(s):  Medication Refill         ASSESSMENT/PLAN:  1. Mixed hyperlipidemia  -     atorvastatin (LIPITOR) 20 MG tablet; Take 1 tablet by mouth daily, Disp-90 tablet, R-1Normal  -     Lipid Panel; Future  2. Essential hypertension  -     carvedilol (COREG) 25 MG tablet; Take 1 tablet by mouth 2 times daily (with meals), Disp-180 tablet, R-1Normal  -     valsartan (DIOVAN) 320 MG tablet; Take 1 tablet by mouth daily, Disp-90 tablet, R-1Normal  3. Obesity (BMI 30-39.9)  4. Other chronic pulmonary embolism without acute cor pulmonale (HCC)  Assessment & Plan:   Monitored by specialist- no acute findings meriting change in the plan  5. Rash and nonspecific skin eruption  -     hydrocortisone (ALA-ADRIANA) 1 % cream; Apply topically 2 times daily., Disp-30 g, R-1, Normal    Await labs, Hem- Onc did her CBC and CMP - reviewed last results from 2023, to continue to avoid salt and fatty foods.  Start topical Hydrocortisone cream bid- to not use near the urethra or vagina. RTC prn.  Advised patient to lose weight.  Also advised to exercise regularly, to eat healthy foods, and to control portion sizes.  Reminded to take the RSV, Shingrix vaccine and Flu vaccine, she again refused the Dexa scan.     Return in about 6 months (around 8/15/2024) for physical, fasting labs or prn sooner; next week- fasting labs.         Subjective   SUBJECTIVE/OBJECTIVE:  Medication Refill  Pertinent negatives include no abdominal pain, chest pain, chills, coughing, fever, headaches, nausea, rash or vomiting.     Patient comes today for her 6 month follow up and medication refills-     HLP- She takes Atorvastatin 20 mg in the morning, breakfast or lunch are the heaviest meals, cooks with canola oil, uses some margarine with her vegetables, eats more chicken, has some sausages with breakfast, overall doing good with a

## 2024-02-19 ENCOUNTER — TELEPHONE (OUTPATIENT)
Dept: FAMILY MEDICINE CLINIC | Facility: CLINIC | Age: 79
End: 2024-02-19

## 2024-02-19 NOTE — TELEPHONE ENCOUNTER
----- Message from Noemy Gauthier sent at 2/19/2024  8:26 AM EST -----  Subject: Medication Problem     Medication: vaginal itching  Dosage: As written   Ordering Provider: Lucy Calderon    Question/Problem: patient was seen on 2/14/24 and received the wrong   medication. Please call when medication has been ordered      Pharmacy: [id number] [place], [place] - [address]   [phone] -  [phone]    ---------------------------------------------------------------------------  --------------  CALL BACK INFO  [phone]; OK to leave message on voicemail  ---------------------------------------------------------------------------  --------------    SCRIPT ANSWERS  Relationship to Patient: Self

## 2024-02-21 ENCOUNTER — HOSPITAL ENCOUNTER (OUTPATIENT)
Age: 79
Discharge: HOME | End: 2024-02-24

## 2024-02-21 DIAGNOSIS — E78.2 MIXED HYPERLIPIDEMIA: ICD-10-CM

## 2024-02-21 LAB
CHOLEST SERPL-MCNC: 135 MG/DL
HDLC SERPL-MCNC: 58 MG/DL (ref 40–60)
HDLC SERPL: 2.3
LDLC SERPL CALC-MCNC: 66 MG/DL
TRIGL SERPL-MCNC: 55 MG/DL (ref 35–150)
VLDLC SERPL CALC-MCNC: 11 MG/DL (ref 6–23)

## 2024-04-15 DIAGNOSIS — Z79.01 LONG TERM (CURRENT) USE OF ANTICOAGULANTS: ICD-10-CM

## 2024-04-15 DIAGNOSIS — Z86.711 PERSONAL HISTORY OF PULMONARY EMBOLISM: Primary | ICD-10-CM

## 2024-04-18 ENCOUNTER — HOSPITAL ENCOUNTER (OUTPATIENT)
Dept: LAB | Age: 79
Discharge: HOME | End: 2024-04-18
Payer: MEDICARE

## 2024-04-18 ENCOUNTER — OFFICE VISIT (OUTPATIENT)
Dept: ONCOLOGY | Age: 79
End: 2024-04-18
Payer: MEDICARE

## 2024-04-18 VITALS
RESPIRATION RATE: 16 BRPM | HEART RATE: 62 BPM | SYSTOLIC BLOOD PRESSURE: 132 MMHG | TEMPERATURE: 98.4 F | WEIGHT: 156 LBS | DIASTOLIC BLOOD PRESSURE: 86 MMHG | HEIGHT: 58 IN | OXYGEN SATURATION: 98 % | BODY MASS INDEX: 32.75 KG/M2

## 2024-04-18 DIAGNOSIS — Z86.711 PERSONAL HISTORY OF PULMONARY EMBOLISM: ICD-10-CM

## 2024-04-18 DIAGNOSIS — Z86.711 PERSONAL HISTORY OF PULMONARY EMBOLISM: Primary | ICD-10-CM

## 2024-04-18 DIAGNOSIS — Z79.01 LONG TERM (CURRENT) USE OF ANTICOAGULANTS: ICD-10-CM

## 2024-04-18 LAB
ALBUMIN SERPL-MCNC: 3.8 G/DL (ref 3.2–4.6)
ALBUMIN/GLOB SERPL: 1 (ref 0.4–1.6)
ALP SERPL-CCNC: 158 U/L (ref 50–136)
ALT SERPL-CCNC: 26 U/L (ref 12–65)
ANION GAP SERPL CALC-SCNC: 4 MMOL/L (ref 2–11)
AST SERPL-CCNC: 16 U/L (ref 15–37)
BASOPHILS # BLD: 0 K/UL (ref 0–0.2)
BASOPHILS NFR BLD: 0 % (ref 0–2)
BILIRUB SERPL-MCNC: 0.6 MG/DL (ref 0.2–1.1)
BUN SERPL-MCNC: 21 MG/DL (ref 8–23)
CALCIUM SERPL-MCNC: 9.6 MG/DL (ref 8.3–10.4)
CHLORIDE SERPL-SCNC: 108 MMOL/L (ref 103–113)
CO2 SERPL-SCNC: 27 MMOL/L (ref 21–32)
CREAT SERPL-MCNC: 0.8 MG/DL (ref 0.6–1)
D DIMER PPP FEU-MCNC: <0.27 UG/ML(FEU)
DIFFERENTIAL METHOD BLD: NORMAL
EOSINOPHIL # BLD: 0.4 K/UL (ref 0–0.8)
EOSINOPHIL NFR BLD: 5 % (ref 0.5–7.8)
ERYTHROCYTE [DISTWIDTH] IN BLOOD BY AUTOMATED COUNT: 14.6 % (ref 11.9–14.6)
GLOBULIN SER CALC-MCNC: 3.8 G/DL (ref 2.8–4.5)
GLUCOSE SERPL-MCNC: 112 MG/DL (ref 65–100)
HCT VFR BLD AUTO: 39.9 % (ref 35.8–46.3)
HGB BLD-MCNC: 13 G/DL (ref 11.7–15.4)
IMM GRANULOCYTES # BLD AUTO: 0 K/UL (ref 0–0.5)
IMM GRANULOCYTES NFR BLD AUTO: 0 % (ref 0–5)
LYMPHOCYTES # BLD: 2.8 K/UL (ref 0.5–4.6)
LYMPHOCYTES NFR BLD: 34 % (ref 13–44)
MCH RBC QN AUTO: 29.7 PG (ref 26.1–32.9)
MCHC RBC AUTO-ENTMCNC: 32.6 G/DL (ref 31.4–35)
MCV RBC AUTO: 91.1 FL (ref 82–102)
MONOCYTES # BLD: 0.5 K/UL (ref 0.1–1.3)
MONOCYTES NFR BLD: 7 % (ref 4–12)
NEUTS SEG # BLD: 4.3 K/UL (ref 1.7–8.2)
NEUTS SEG NFR BLD: 54 % (ref 43–78)
NRBC # BLD: 0 K/UL (ref 0–0.2)
PLATELET # BLD AUTO: 222 K/UL (ref 150–450)
PMV BLD AUTO: 10.4 FL (ref 9.4–12.3)
POTASSIUM SERPL-SCNC: 4 MMOL/L (ref 3.5–5.1)
PROT SERPL-MCNC: 7.6 G/DL (ref 6.3–8.2)
RBC # BLD AUTO: 4.38 M/UL (ref 4.05–5.2)
SODIUM SERPL-SCNC: 139 MMOL/L (ref 136–146)
WBC # BLD AUTO: 8.1 K/UL (ref 4.3–11.1)

## 2024-04-18 PROCEDURE — 85025 COMPLETE CBC W/AUTO DIFF WBC: CPT

## 2024-04-18 PROCEDURE — 3075F SYST BP GE 130 - 139MM HG: CPT | Performed by: NURSE PRACTITIONER

## 2024-04-18 PROCEDURE — 99214 OFFICE O/P EST MOD 30 MIN: CPT | Performed by: NURSE PRACTITIONER

## 2024-04-18 PROCEDURE — 3079F DIAST BP 80-89 MM HG: CPT | Performed by: NURSE PRACTITIONER

## 2024-04-18 PROCEDURE — 80053 COMPREHEN METABOLIC PANEL: CPT

## 2024-04-18 PROCEDURE — 1123F ACP DISCUSS/DSCN MKR DOCD: CPT | Performed by: NURSE PRACTITIONER

## 2024-04-18 PROCEDURE — 36415 COLL VENOUS BLD VENIPUNCTURE: CPT

## 2024-04-18 PROCEDURE — 85379 FIBRIN DEGRADATION QUANT: CPT

## 2024-04-18 RX ORDER — HYDROCORTISONE 10 MG/G
CREAM TOPICAL 2 TIMES DAILY
COMMUNITY
Start: 2024-02-15

## 2024-04-18 RX ORDER — LOSARTAN POTASSIUM 50 MG/1
50 TABLET ORAL DAILY
COMMUNITY
Start: 2019-09-24

## 2024-04-18 ASSESSMENT — PATIENT HEALTH QUESTIONNAIRE - PHQ9
1. LITTLE INTEREST OR PLEASURE IN DOING THINGS: NOT AT ALL
SUM OF ALL RESPONSES TO PHQ QUESTIONS 1-9: 0
SUM OF ALL RESPONSES TO PHQ9 QUESTIONS 1 & 2: 0
2. FEELING DOWN, DEPRESSED OR HOPELESS: NOT AT ALL

## 2024-04-18 NOTE — PROGRESS NOTES
Data Source: Patient, [facility] record.    4/18/2024    11:14 AM    Dariela Sebastian [id number]    79 y.o.      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 CTA chest in ED 9/21/2019 had shown RLL segmental PE with small infiltrates versus early pulmonary infarcts at the right base.  She was hospitalized from 9/21/2019 to 9/23/2019. Echo without RT ventricular strain.  She was discharged on rivaroxaban.  She subsequently represented to ED 9/29/2019 with facial itching.  Per ED note, no tongue swelling, breathing difficulties, or stridor noted.  Patient was felt to have allergic reaction to possibly rivaroxaban versus recently changed blood pressure or cough medications, advised Benadryl as needed.  She is now seen by us in terms of anticoagulation recommendation. Patient reports family history significant for mother having PE in her 90s. Denies any other personal history of thrombophilia or miscarriages. Denies any prolonged travel, hospitalizations, prolonged immobilization, or trauma to extremities in the 6 months prior to thromboembolic event.  Denies any limb swelling prior to the blood clot. Denies any recent mammogram, screening colonoscopies, or pap smears. She reports most of her allergy symptoms as resolved and now off benadryl. She would like to stay on rivaroxaban as she is not certain that is the cause of her allergy symptoms. Denies any blood per rectum. No significant LE swelling noted today.     Interval History:  4/18/24: Ms. Sebastian returns today for follow up regarding her history of thrombophilia on chronic anticoagulation. She has been well overall and denies having any new

## 2024-04-19 ENCOUNTER — TELEPHONE (OUTPATIENT)
Dept: ONCOLOGY | Age: 79
End: 2024-04-19

## 2024-04-19 NOTE — TELEPHONE ENCOUNTER
Physician provider: Onel Bagley MD  Reason for today's call: Medication update  Last office visit:N/A    Patient notified that their information will be routed to the [facility] clinical triage team for review. Patient is advised that they will receive a phone call from the triage department. If symptoms worsen before receiving a call back, the patient has been advised to proceed to the nearest ED.      Pt called in to report that she has not taken Losartan or Metoprolol Tartrate \"for a while now\"/

## 2024-05-06 ENCOUNTER — TELEPHONE (OUTPATIENT)
Dept: FAMILY MEDICINE CLINIC | Facility: CLINIC | Age: 79
End: 2024-05-06

## 2024-05-08 ENCOUNTER — TELEPHONE (OUTPATIENT)
Dept: FAMILY MEDICINE CLINIC | Facility: CLINIC | Age: 79
End: 2024-05-08

## 2024-05-08 RX ORDER — HYDROCORTISONE 10 MG/G
CREAM TOPICAL 2 TIMES DAILY
Qty: 26 G | Refills: 1 | Status: SHIPPED | OUTPATIENT
Start: 2024-05-08

## 2024-05-08 NOTE — TELEPHONE ENCOUNTER
Patient left a message on Monday to get her hydrocortisone cream refilled, called today to check if it has been sent yet. Did not see it in patients chart, let the pt know she will receive a call as soon as we're able to get that done for her.

## 2024-07-22 ENCOUNTER — OFFICE VISIT (OUTPATIENT)
Dept: FAMILY MEDICINE CLINIC | Facility: CLINIC | Age: 79
End: 2024-07-22
Payer: MEDICARE

## 2024-07-22 VITALS
HEIGHT: 58 IN | RESPIRATION RATE: 16 BRPM | WEIGHT: 152.6 LBS | BODY MASS INDEX: 32.03 KG/M2 | TEMPERATURE: 96.9 F | SYSTOLIC BLOOD PRESSURE: 120 MMHG | OXYGEN SATURATION: 95 % | DIASTOLIC BLOOD PRESSURE: 80 MMHG | HEART RATE: 68 BPM

## 2024-07-22 DIAGNOSIS — R05.9 COUGH, UNSPECIFIED TYPE: ICD-10-CM

## 2024-07-22 DIAGNOSIS — J06.9 ACUTE URI: Primary | ICD-10-CM

## 2024-07-22 LAB
EXP DATE SOLUTION: NORMAL
EXP DATE SWAB: NORMAL
EXPIRATION DATE: NORMAL
INFLUENZA A ANTIGEN, POC: NEGATIVE
INFLUENZA B ANTIGEN, POC: NEGATIVE
LOT NUMBER POC: NORMAL
LOT NUMBER SOLUTION: NORMAL
LOT NUMBER SWAB: NORMAL
RSV RNA, POC: NEGATIVE
SARS-COV-2 RNA, POC: NEGATIVE
VALID INTERNAL CONTROL, POC: YES
VALID INTERNAL CONTROL, POC: YES

## 2024-07-22 PROCEDURE — 87634 RSV DNA/RNA AMP PROBE: CPT | Performed by: NURSE PRACTITIONER

## 2024-07-22 PROCEDURE — 87804 INFLUENZA ASSAY W/OPTIC: CPT | Performed by: NURSE PRACTITIONER

## 2024-07-22 PROCEDURE — 99213 OFFICE O/P EST LOW 20 MIN: CPT | Performed by: NURSE PRACTITIONER

## 2024-07-22 PROCEDURE — 87635 SARS-COV-2 COVID-19 AMP PRB: CPT | Performed by: NURSE PRACTITIONER

## 2024-07-22 PROCEDURE — 3074F SYST BP LT 130 MM HG: CPT | Performed by: NURSE PRACTITIONER

## 2024-07-22 PROCEDURE — 3079F DIAST BP 80-89 MM HG: CPT | Performed by: NURSE PRACTITIONER

## 2024-07-22 PROCEDURE — 1123F ACP DISCUSS/DSCN MKR DOCD: CPT | Performed by: NURSE PRACTITIONER

## 2024-07-22 RX ORDER — DOXYCYCLINE HYCLATE 100 MG
100 TABLET ORAL 2 TIMES DAILY
Qty: 20 TABLET | Refills: 0 | Status: SHIPPED | OUTPATIENT
Start: 2024-07-22 | End: 2024-08-01

## 2024-07-22 RX ORDER — BENZONATATE 100 MG/1
100 CAPSULE ORAL 2 TIMES DAILY PRN
Qty: 20 CAPSULE | Refills: 0 | Status: SHIPPED | OUTPATIENT
Start: 2024-07-22 | End: 2024-07-29

## 2024-07-22 ASSESSMENT — ENCOUNTER SYMPTOMS
COUGH: 1
SINUS PAIN: 0
CONSTIPATION: 0
SHORTNESS OF BREATH: 0
NAUSEA: 0
BACK PAIN: 0
VOMITING: 0
EYE PAIN: 0
SORE THROAT: 0
EYE REDNESS: 0
BLOOD IN STOOL: 0
DIARRHEA: 0

## 2024-08-02 DIAGNOSIS — E78.2 MIXED HYPERLIPIDEMIA: ICD-10-CM

## 2024-08-02 RX ORDER — ATORVASTATIN CALCIUM 20 MG/1
20 TABLET, FILM COATED ORAL DAILY
Qty: 90 TABLET | Refills: 1 | OUTPATIENT
Start: 2024-08-02

## 2024-08-09 DIAGNOSIS — I10 ESSENTIAL HYPERTENSION: ICD-10-CM

## 2024-08-09 RX ORDER — CARVEDILOL 25 MG/1
25 TABLET ORAL 2 TIMES DAILY WITH MEALS
Qty: 180 TABLET | Refills: 1 | OUTPATIENT
Start: 2024-08-09

## 2024-08-12 ENCOUNTER — TELEPHONE (OUTPATIENT)
Dept: FAMILY MEDICINE CLINIC | Facility: CLINIC | Age: 79
End: 2024-08-12

## 2024-08-12 RX ORDER — HYDROXYZINE HYDROCHLORIDE 25 MG/1
25 TABLET, FILM COATED ORAL EVERY 8 HOURS PRN
Qty: 60 TABLET | Refills: 0 | Status: SHIPPED | OUTPATIENT
Start: 2024-08-12 | End: 2024-08-16 | Stop reason: SDUPTHER

## 2024-08-16 ENCOUNTER — OFFICE VISIT (OUTPATIENT)
Dept: FAMILY MEDICINE CLINIC | Facility: CLINIC | Age: 79
End: 2024-08-16

## 2024-08-16 VITALS
HEART RATE: 69 BPM | WEIGHT: 151.2 LBS | BODY MASS INDEX: 31.74 KG/M2 | HEIGHT: 58 IN | DIASTOLIC BLOOD PRESSURE: 80 MMHG | TEMPERATURE: 97.4 F | RESPIRATION RATE: 16 BRPM | OXYGEN SATURATION: 96 % | SYSTOLIC BLOOD PRESSURE: 138 MMHG

## 2024-08-16 DIAGNOSIS — E66.09 CLASS 1 OBESITY DUE TO EXCESS CALORIES WITH SERIOUS COMORBIDITY AND BODY MASS INDEX (BMI) OF 31.0 TO 31.9 IN ADULT: ICD-10-CM

## 2024-08-16 DIAGNOSIS — E78.2 MIXED HYPERLIPIDEMIA: Primary | ICD-10-CM

## 2024-08-16 DIAGNOSIS — L29.9 ITCHING: ICD-10-CM

## 2024-08-16 DIAGNOSIS — I10 ESSENTIAL HYPERTENSION: ICD-10-CM

## 2024-08-16 LAB
ALBUMIN SERPL-MCNC: 3.6 G/DL (ref 3.2–4.6)
ALBUMIN/GLOB SERPL: 1 (ref 1–1.9)
ALP SERPL-CCNC: 129 U/L (ref 35–104)
ALT SERPL-CCNC: 26 U/L (ref 12–65)
ANION GAP SERPL CALC-SCNC: 9 MMOL/L (ref 9–18)
AST SERPL-CCNC: 22 U/L (ref 15–37)
BASOPHILS # BLD: 0 K/UL (ref 0–0.2)
BASOPHILS NFR BLD: 0 % (ref 0–2)
BILIRUB SERPL-MCNC: 0.4 MG/DL (ref 0–1.2)
BUN SERPL-MCNC: 17 MG/DL (ref 8–23)
CALCIUM SERPL-MCNC: 9.6 MG/DL (ref 8.8–10.2)
CHLORIDE SERPL-SCNC: 106 MMOL/L (ref 98–107)
CHOLEST SERPL-MCNC: 133 MG/DL (ref 0–200)
CO2 SERPL-SCNC: 28 MMOL/L (ref 20–28)
CREAT SERPL-MCNC: 0.9 MG/DL (ref 0.6–1.1)
DIFFERENTIAL METHOD BLD: ABNORMAL
EOSINOPHIL # BLD: 0.6 K/UL (ref 0–0.8)
EOSINOPHIL NFR BLD: 9 % (ref 0.5–7.8)
ERYTHROCYTE [DISTWIDTH] IN BLOOD BY AUTOMATED COUNT: 14.6 % (ref 11.9–14.6)
GLOBULIN SER CALC-MCNC: 3.6 G/DL (ref 2.3–3.5)
GLUCOSE SERPL-MCNC: 101 MG/DL (ref 70–99)
HCT VFR BLD AUTO: 42.3 % (ref 35.8–46.3)
HDLC SERPL-MCNC: 48 MG/DL (ref 40–60)
HDLC SERPL: 2.8 (ref 0–5)
HGB BLD-MCNC: 13.6 G/DL (ref 11.7–15.4)
IMM GRANULOCYTES # BLD AUTO: 0 K/UL (ref 0–0.5)
IMM GRANULOCYTES NFR BLD AUTO: 0 % (ref 0–5)
LDLC SERPL CALC-MCNC: 72 MG/DL (ref 0–100)
LYMPHOCYTES # BLD: 3 K/UL (ref 0.5–4.6)
LYMPHOCYTES NFR BLD: 40 % (ref 13–44)
MCH RBC QN AUTO: 30.2 PG (ref 26.1–32.9)
MCHC RBC AUTO-ENTMCNC: 32.2 G/DL (ref 31.4–35)
MCV RBC AUTO: 94 FL (ref 82–102)
MONOCYTES # BLD: 0.7 K/UL (ref 0.1–1.3)
MONOCYTES NFR BLD: 9 % (ref 4–12)
NEUTS SEG # BLD: 3.1 K/UL (ref 1.7–8.2)
NEUTS SEG NFR BLD: 42 % (ref 43–78)
NRBC # BLD: 0 K/UL (ref 0–0.2)
PLATELET # BLD AUTO: 230 K/UL (ref 150–450)
PMV BLD AUTO: 12 FL (ref 9.4–12.3)
POTASSIUM SERPL-SCNC: 4.5 MMOL/L (ref 3.5–5.1)
PROT SERPL-MCNC: 7.1 G/DL (ref 6.3–8.2)
RBC # BLD AUTO: 4.5 M/UL (ref 4.05–5.2)
SODIUM SERPL-SCNC: 143 MMOL/L (ref 136–145)
TRIGL SERPL-MCNC: 64 MG/DL (ref 0–150)
VLDLC SERPL CALC-MCNC: 13 MG/DL (ref 6–23)
WBC # BLD AUTO: 7.5 K/UL (ref 4.3–11.1)

## 2024-08-16 RX ORDER — CARVEDILOL 25 MG/1
25 TABLET ORAL 2 TIMES DAILY WITH MEALS
Qty: 180 TABLET | Refills: 1 | Status: SHIPPED | OUTPATIENT
Start: 2024-08-16

## 2024-08-16 RX ORDER — ATORVASTATIN CALCIUM 20 MG/1
20 TABLET, FILM COATED ORAL DAILY
Qty: 90 TABLET | Refills: 1 | Status: SHIPPED | OUTPATIENT
Start: 2024-08-16

## 2024-08-16 RX ORDER — TRIAMCINOLONE ACETONIDE 1 MG/G
CREAM TOPICAL
Qty: 60 G | Refills: 0 | Status: SHIPPED | OUTPATIENT
Start: 2024-08-16

## 2024-08-16 RX ORDER — HYDROXYZINE HYDROCHLORIDE 25 MG/1
25 TABLET, FILM COATED ORAL 2 TIMES DAILY
Qty: 60 TABLET | Refills: 0 | Status: SHIPPED | OUTPATIENT
Start: 2024-08-16 | End: 2024-09-15

## 2024-08-16 RX ORDER — VALSARTAN 320 MG/1
320 TABLET ORAL DAILY
Qty: 90 TABLET | Refills: 1 | Status: SHIPPED | OUTPATIENT
Start: 2024-08-16

## 2024-08-16 ASSESSMENT — PATIENT HEALTH QUESTIONNAIRE - PHQ9
SUM OF ALL RESPONSES TO PHQ QUESTIONS 1-9: 0
2. FEELING DOWN, DEPRESSED OR HOPELESS: NOT AT ALL
SUM OF ALL RESPONSES TO PHQ9 QUESTIONS 1 & 2: 0
SUM OF ALL RESPONSES TO PHQ QUESTIONS 1-9: 0
SUM OF ALL RESPONSES TO PHQ QUESTIONS 1-9: 0
1. LITTLE INTEREST OR PLEASURE IN DOING THINGS: NOT AT ALL
SUM OF ALL RESPONSES TO PHQ QUESTIONS 1-9: 0

## 2024-08-16 ASSESSMENT — ENCOUNTER SYMPTOMS
VOMITING: 0
DIARRHEA: 0
SINUS PAIN: 0
ABDOMINAL PAIN: 0
COLOR CHANGE: 0
COUGH: 0
WHEEZING: 0
ABDOMINAL DISTENTION: 0
NAUSEA: 0
PHOTOPHOBIA: 0
SHORTNESS OF BREATH: 0

## 2024-08-16 NOTE — PROGRESS NOTES
Dariela Sebastian (:  1945) is a 79 y.o. female,Established patient, here for evaluation of the following chief complaint(s):  Medication Refill and Labs Only      Assessment & Plan   ASSESSMENT/PLAN:  1. Mixed hyperlipidemia  -     atorvastatin (LIPITOR) 20 MG tablet; Take 1 tablet by mouth daily, Disp-90 tablet, R-1Normal  -     Comprehensive Metabolic Panel; Future  -     Lipid Panel; Future  2. Essential hypertension  -     carvedilol (COREG) 25 MG tablet; Take 1 tablet by mouth 2 times daily (with meals), Disp-180 tablet, R-1Normal  -     valsartan (DIOVAN) 320 MG tablet; Take 1 tablet by mouth daily, Disp-90 tablet, R-1Normal  -     CBC with Auto Differential; Future  -     Comprehensive Metabolic Panel; Future  3. Itching  -     hydrOXYzine HCl (ATARAX) 25 MG tablet; Take 1 tablet by mouth 2 times daily, Disp-60 tablet, R-0Normal  -     triamcinolone (KENALOG) 0.1 % cream; Apply to affected area 2 times daily., Disp-60 g, R-0, Normal  -     [facility]  4. Class 1 obesity due to excess calories with serious comorbidity and body mass index (BMI) of 31.0 to 31.9 in adult    Itching- unchanged, advised to take Hydroxyzine prn only- not daily, DC Hydrocortisone topical, to start topical Triamcinolone oint bid- to not use near the urethra or vagina, advised to use cotton underwear, to not wear underwear whenever possible; to keep the skin clean and dry; also referred to Dermatology. RTC in 3-4 weeks to evaluate.  HLP- well controlled, continue current management.  HTN- controlled, continue current management, to schedule another eye appointment.  Obesity- slightly better, continue current management.   She had CBC and CMP done for Hem/Onc in April- reviewed with patient.    Advised to take the Flu, Covid, RSV, Shingrix vaccines at the pharmacy.    Return in about 6 months (around 2025) for medication refills, fasting labs or prn sooner; 3-4 wks- f/u itching.         Subjective

## 2024-10-14 DIAGNOSIS — Z86.711 PERSONAL HISTORY OF PULMONARY EMBOLISM: Primary | ICD-10-CM

## 2024-10-16 ENCOUNTER — TELEPHONE (OUTPATIENT)
Dept: FAMILY MEDICINE CLINIC | Facility: CLINIC | Age: 79
End: 2024-10-16

## 2024-10-16 NOTE — TELEPHONE ENCOUNTER
----- Message from Brian LOMELI sent at 10/16/2024  8:37 AM EDT -----  Regarding: E[facility] Appointment Request  E[facility] Appointment Request    Patient needs appointment for E[facility] Appointment Type: Existing Condition Follow Up.    Patient Requested Dates(s): October 18, 2024  Patient Requested Time: 10:30 AM  Provider Name Lucy Calderon MD    Reason for Appointment Request: Established Patient - No appointments available during search. Mrs. Sebastian wants to change her appointment time on Friday because she cannot get in time that early morning so if it's okay to change it into 10:30 AM.  --------------------------------------------------------------------------------------------------------------------------    Relationship to Patient: Self     Call Back Information: OK to leave message on voicemail  Preferred Call Back Number:  [phone]

## 2024-10-17 ENCOUNTER — OFFICE VISIT (OUTPATIENT)
Dept: ONCOLOGY | Age: 79
End: 2024-10-17

## 2024-10-17 ENCOUNTER — HOSPITAL ENCOUNTER (OUTPATIENT)
Dept: LAB | Age: 79
Discharge: HOME | End: 2024-10-17
Payer: MEDICARE

## 2024-10-17 VITALS
HEART RATE: 61 BPM | RESPIRATION RATE: 18 BRPM | DIASTOLIC BLOOD PRESSURE: 82 MMHG | SYSTOLIC BLOOD PRESSURE: 162 MMHG | WEIGHT: 150 LBS | BODY MASS INDEX: 31.49 KG/M2 | OXYGEN SATURATION: 99 % | HEIGHT: 58 IN | TEMPERATURE: 98 F

## 2024-10-17 DIAGNOSIS — Z79.01 LONG TERM (CURRENT) USE OF ANTICOAGULANTS: Primary | ICD-10-CM

## 2024-10-17 DIAGNOSIS — Z86.711 PERSONAL HISTORY OF PULMONARY EMBOLISM: ICD-10-CM

## 2024-10-17 LAB
ALBUMIN SERPL-MCNC: 3.9 G/DL (ref 3.2–4.6)
ALBUMIN/GLOB SERPL: 1 (ref 1–1.9)
ALP SERPL-CCNC: 169 U/L (ref 35–104)
ALT SERPL-CCNC: 20 U/L (ref 8–45)
ANION GAP SERPL CALC-SCNC: 10 MMOL/L (ref 9–18)
AST SERPL-CCNC: 23 U/L (ref 15–37)
BASOPHILS # BLD: 0 K/UL (ref 0–0.2)
BASOPHILS NFR BLD: 0 % (ref 0–2)
BILIRUB SERPL-MCNC: 0.8 MG/DL (ref 0–1.2)
BUN SERPL-MCNC: 12 MG/DL (ref 8–23)
CALCIUM SERPL-MCNC: 9.6 MG/DL (ref 8.8–10.2)
CHLORIDE SERPL-SCNC: 104 MMOL/L (ref 98–107)
CO2 SERPL-SCNC: 28 MMOL/L (ref 20–28)
CREAT SERPL-MCNC: 0.85 MG/DL (ref 0.6–1.1)
D DIMER PPP FEU-MCNC: <0.27 UG/ML(FEU)
DIFFERENTIAL METHOD BLD: NORMAL
EOSINOPHIL # BLD: 0.2 K/UL (ref 0–0.8)
EOSINOPHIL NFR BLD: 3 % (ref 0.5–7.8)
ERYTHROCYTE [DISTWIDTH] IN BLOOD BY AUTOMATED COUNT: 14.6 % (ref 11.9–14.6)
GLOBULIN SER CALC-MCNC: 4.1 G/DL (ref 2.3–3.5)
GLUCOSE SERPL-MCNC: 105 MG/DL (ref 70–99)
HCT VFR BLD AUTO: 41.9 % (ref 35.8–46.3)
HGB BLD-MCNC: 13.9 G/DL (ref 11.7–15.4)
IMM GRANULOCYTES # BLD AUTO: 0 K/UL (ref 0–0.5)
IMM GRANULOCYTES NFR BLD AUTO: 0 % (ref 0–5)
LYMPHOCYTES # BLD: 2.3 K/UL (ref 0.5–4.6)
LYMPHOCYTES NFR BLD: 32 % (ref 13–44)
MCH RBC QN AUTO: 30.5 PG (ref 26.1–32.9)
MCHC RBC AUTO-ENTMCNC: 33.2 G/DL (ref 31.4–35)
MCV RBC AUTO: 92.1 FL (ref 82–102)
MONOCYTES # BLD: 0.6 K/UL (ref 0.1–1.3)
MONOCYTES NFR BLD: 8 % (ref 4–12)
NEUTS SEG # BLD: 4 K/UL (ref 1.7–8.2)
NEUTS SEG NFR BLD: 57 % (ref 43–78)
NRBC # BLD: 0 K/UL (ref 0–0.2)
PLATELET # BLD AUTO: 229 K/UL (ref 150–450)
PMV BLD AUTO: 10.2 FL (ref 9.4–12.3)
POTASSIUM SERPL-SCNC: 3.5 MMOL/L (ref 3.5–5.1)
PROT SERPL-MCNC: 8 G/DL (ref 6.3–8.2)
RBC # BLD AUTO: 4.55 M/UL (ref 4.05–5.2)
SODIUM SERPL-SCNC: 142 MMOL/L (ref 136–145)
WBC # BLD AUTO: 7.2 K/UL (ref 4.3–11.1)

## 2024-10-17 PROCEDURE — 85379 FIBRIN DEGRADATION QUANT: CPT

## 2024-10-17 PROCEDURE — 85025 COMPLETE CBC W/AUTO DIFF WBC: CPT

## 2024-10-17 PROCEDURE — 36415 COLL VENOUS BLD VENIPUNCTURE: CPT

## 2024-10-17 PROCEDURE — 80053 COMPREHEN METABOLIC PANEL: CPT

## 2024-10-17 ASSESSMENT — PATIENT HEALTH QUESTIONNAIRE - PHQ9
SUM OF ALL RESPONSES TO PHQ QUESTIONS 1-9: 0
SUM OF ALL RESPONSES TO PHQ QUESTIONS 1-9: 0
1. LITTLE INTEREST OR PLEASURE IN DOING THINGS: NOT AT ALL
2. FEELING DOWN, DEPRESSED OR HOPELESS: NOT AT ALL
SUM OF ALL RESPONSES TO PHQ QUESTIONS 1-9: 0
SUM OF ALL RESPONSES TO PHQ QUESTIONS 1-9: 0
SUM OF ALL RESPONSES TO PHQ9 QUESTIONS 1 & 2: 0

## 2024-10-17 NOTE — PATIENT INSTRUCTIONS
10/17/2024 8  4.0 - 12.0 % Final    Eosinophils % 10/17/2024 3  0.5 - 7.8 % Final    Basophils % 10/17/2024 0  0.0 - 2.0 % Final    Immature Granulocytes % 10/17/2024 0  0.0 - 5.0 % Final    Neutrophils Absolute 10/17/2024 4.0  1.7 - 8.2 K/UL Final    Lymphocytes Absolute 10/17/2024 2.3  0.5 - 4.6 K/UL Final    Monocytes Absolute 10/17/2024 0.6  0.1 - 1.3 K/UL Final    Eosinophils Absolute 10/17/2024 0.2  0.0 - 0.8 K/UL Final    Basophils Absolute 10/17/2024 0.0  0.0 - 0.2 K/UL Final    Immature Granulocytes Absolute 10/17/2024 0.0  0.0 - 0.5 K/UL Final    Differential Type 10/17/2024 AUTOMATED    Final    Sodium 10/17/2024 142  136 - 145 mmol/L Final    Potassium 10/17/2024 3.5  3.5 - 5.1 mmol/L Final    Chloride 10/17/2024 104  98 - 107 mmol/L Final    CO2 10/17/2024 28  20 - 28 mmol/L Final    Anion Gap 10/17/2024 10  9 - 18 mmol/L Final    Glucose 10/17/2024 105 (H)  70 - 99 mg/dL Final    Comment: <70 mg/dL Consistent with, but not fully diagnostic of hypoglycemia.  100 - 125 mg/dL Impaired fasting glucose/consistent with pre-diabetes mellitus.  > 126 mg/dl Fasting glucose consistent with overt diabetes mellitus      BUN 10/17/2024 12  8 - 23 MG/DL Final    Creatinine 10/17/2024 0.85  0.60 - 1.10 MG/DL Final    Est, Glom Filt Rate 10/17/2024 70  >60 ml/min/1.73m2 Final    Comment:    Pediatric calculator link: https://www.kidney.org/professionals/kdoqi/gfr_calculatorped     These results are not intended for use in patients <18 years of age.     eGFR results are calculated without a race factor using  the 2021 CKD-EPI equation. Careful clinical correlation is recommended, particularly when comparing to results calculated using previous equations.  The CKD-EPI equation is less accurate in patients with extremes of muscle mass, extra-renal metabolism of creatinine, excessive creatine ingestion, or following therapy that affects renal tubular secretion.      Calcium 10/17/2024 9.6  8.8 - 10.2 MG/DL Final    Total

## 2024-10-17 NOTE — PROGRESS NOTES
advised Benadryl as needed.  She is now seen by us in terms of anticoagulation recommendation. Patient reports family history significant for mother having PE in her 90s. Denies any other personal history of thrombophilia or miscarriages. Denies any prolonged travel, hospitalizations, prolonged immobilization, or trauma to extremities in the 6 months prior to thromboembolic event.  Denies any limb swelling prior to the blood clot. Denies any recent mammogram, screening colonoscopies, or pap smears. She reports most of her allergy symptoms as resolved and now off benadryl. She would like to stay on rivaroxaban as she is not certain that is the cause of her allergy symptoms. Denies any blood per rectum. No significant LE swelling noted today.      In summary, elderly female with recent presentation of what appears to be an unprovoked PE without any preceding limb swelling. Given her presentation in absence of any lifestyle modifiable factors, she will likely need long term anticoagulation.     12/4/19:Labs from previous visit reviewed: Prothrombin gene mutation negative.  Anti-cardiolipin antibodies and anti-beta-2 glycoprotein antibodies negative.  Remaining hypercoagulable work-up sent out today.  D-dimer normal range 10/19.  Bilateral LE Dopplers 11/19 -ve.  Iron stores adequate.  On vitamin B12, MCV normalized, platelet count normal.  Denies any bleeding, falls or trauma. Continue rivaroxaban. Notes off and on skin rash, possibly relating to recent change in her anti-hypertensive which she will discuss further w/ her primary care.     6/9/2020: Discussed case over the telephone.  Patient remains on rivaroxaban 20 mg daily.  Reports tolerating well, denies any bleeding, falls or trauma.  Blood work from last visit with remaining hypercoagulable work-up reviewed: ATIII, protein C, protein S, Factor V Leiden, and Lupus anticoagulant unremarkable.  More recent routine blood work normal range, d-dimer negative.  Did

## 2024-10-24 DIAGNOSIS — L29.9 ITCHING: ICD-10-CM

## 2024-10-24 RX ORDER — HYDROXYZINE HYDROCHLORIDE 25 MG/1
25 TABLET, FILM COATED ORAL 2 TIMES DAILY
Qty: 60 TABLET | Refills: 0 | OUTPATIENT
Start: 2024-10-24

## 2024-10-28 ENCOUNTER — TELEPHONE (OUTPATIENT)
Dept: FAMILY MEDICINE CLINIC | Facility: CLINIC | Age: 79
End: 2024-10-28

## 2024-10-28 NOTE — TELEPHONE ENCOUNTER
Had to reschedule  pt from 11- to 11-  due to change in Dr Calderon's schedule       Tried many times to reach the pt    no answer     voicemail full   [phone]

## 2024-11-08 ENCOUNTER — OFFICE VISIT (OUTPATIENT)
Dept: FAMILY MEDICINE CLINIC | Facility: CLINIC | Age: 79
End: 2024-11-08

## 2024-11-08 VITALS
BODY MASS INDEX: 31.38 KG/M2 | HEART RATE: 74 BPM | SYSTOLIC BLOOD PRESSURE: 134 MMHG | RESPIRATION RATE: 16 BRPM | TEMPERATURE: 98.1 F | DIASTOLIC BLOOD PRESSURE: 80 MMHG | OXYGEN SATURATION: 96 % | WEIGHT: 149.5 LBS | HEIGHT: 58 IN

## 2024-11-08 DIAGNOSIS — Z23 ENCOUNTER FOR IMMUNIZATION: ICD-10-CM

## 2024-11-08 DIAGNOSIS — Z00.00 MEDICARE ANNUAL WELLNESS VISIT, SUBSEQUENT: Primary | ICD-10-CM

## 2024-11-08 ASSESSMENT — PATIENT HEALTH QUESTIONNAIRE - PHQ9
SUM OF ALL RESPONSES TO PHQ QUESTIONS 1-9: 0
SUM OF ALL RESPONSES TO PHQ QUESTIONS 1-9: 0
SUM OF ALL RESPONSES TO PHQ9 QUESTIONS 1 & 2: 0
2. FEELING DOWN, DEPRESSED OR HOPELESS: NOT AT ALL
SUM OF ALL RESPONSES TO PHQ QUESTIONS 1-9: 0
1. LITTLE INTEREST OR PLEASURE IN DOING THINGS: NOT AT ALL
SUM OF ALL RESPONSES TO PHQ QUESTIONS 1-9: 0

## 2024-11-08 NOTE — ACP (ADVANCE CARE PLANNING)
Date of ACP Conversation: 11/8/24  Persons included in Conversation: Patient  Length of ACP Conversation in minutes: 5 minutes    Authorized Decision Maker (if patient is incapable of making informed decisions): NA          For Patients with Decision Making Capacity:   Patient does not have a Living Will, Advance Directives or Healthcare Power of .      Conversation Outcomes / Follow-Up Plan:   Advised patient to make a Living Will, Advance Directives and a Healthcare Power of .  Also advised patient to bring a copy of the same to their chart - patient understands and agrees.

## 2024-11-08 NOTE — PROGRESS NOTES
Medicare Annual Wellness Visit    Dariela Sebastian is here for Medicare AWV    Assessment & Plan   Medicare annual wellness visit, subsequent  Encounter for immunization  -     Influenza, FLUAD Trivalent, (age 65 y+), IM, Preservative Free, 0.5mL  -     respiratory syncytial vaccine, adjuvanted (AREXVY) 120 MCG/0.5ML injection; Inject 0.5 mLs into the muscle once for 1 dose, Disp-0.5 mL, R-0Print    Recommendations for Preventive Services Due: see orders and patient instructions/AVS.  Recommended screening schedule for the next 5-10 years is provided to the patient in written form: see Patient Instructions/AVS.  Patient does not have a living will nor a healthcare POA. Advised patient to make a Living Will, Advance Directives and a Healthcare Power of .  Also advised patient to bring a copy of the same to their chart - patient understands and agrees. She was given the forms for the HCPOA and AD in the past- says she has them at home; she refuses referral to ACP coordinator.  Last TDaP was on 3/2/2021, Shingrix on 11/8/23- advised to take the 2nd dose, Flu vaccine was given today (11/8/24),  Prevnar- 13 on 11/7/2019, Pneumovax- 23 on 3/2/2021; Covid vaccine on 3/11/21 (J and Bionym), 1/19/22 (Pfizer)- refuses any more doses, advised to take the RSV vaccine at the pharmacy- prescription printed.  She did the Cologuard on 11/20/2019; has never had a colonoscopy before and refuses it; denies any GI issues, denies family h/o colon cancer. She has now aged out.  She has aged out for mammograms and refuses to do it again, denies any breast concerns; sister had breast cancer in her early 70s.   Denies any vaginal symptoms.  She refused the bone density test again- has never had this before.     Return in 1 year (on 11/8/2025) for Medicare AWV.     Subjective       Patient's complete Health Risk Assessment and screening values have been reviewed and are found in Flowsheets. The following problems were reviewed today and

## 2024-11-08 NOTE — PATIENT INSTRUCTIONS
Learning About Being Active as an Older Adult  Why is being active important as you get older?     Being active is one of the best things you can do for your health. And it's never too late to start. Being active--or getting active, if you aren't already--has definite benefits. It can:  Give you more energy,  Keep your mind sharp.  Improve balance to reduce your risk of falls.  Help you manage chronic illness with fewer medicines.  No matter how old you are, how fit you are, or what health problems you have, there is a form of activity that will work for you. And the more physical activity you can do, the better your overall health will be.  What kinds of activity can help you stay healthy?  Being more active will make your daily activities easier. Physical activity includes planned exercise and things you do in daily life. There are four types of activity:  Aerobic.  Doing aerobic activity makes your heart and lungs strong.  Includes walking, dancing, and gardening.  Aim for at least 2½ hours spread throughout the week.  It improves your energy and can help you sleep better.  Muscle-strengthening.  This type of activity can help maintain muscle and strengthen bones.  Includes climbing stairs, using resistance bands, and lifting or carrying heavy loads.  Aim for at least twice a week.  It can help protect the knees and other joints.  Stretching.  Stretching gives you better range of motion in joints and muscles.  Includes upper arm stretches, calf stretches, and gentle yoga.  Aim for at least twice a week, preferably after your muscles are warmed up from other activities.  It can help you function better in daily life.  Balancing.  This helps you stay coordinated and have good posture.  Includes heel-to-toe walking, april chi, and certain types of yoga.  Aim for at least 3 days a week.  It can reduce your risk of falling.  Even if you have a hard time meeting the recommendations, it's better to be more active

## 2025-01-29 ENCOUNTER — OFFICE VISIT (OUTPATIENT)
Dept: FAMILY MEDICINE CLINIC | Facility: CLINIC | Age: 80
End: 2025-01-29
Payer: MEDICARE

## 2025-01-29 VITALS
SYSTOLIC BLOOD PRESSURE: 130 MMHG | DIASTOLIC BLOOD PRESSURE: 86 MMHG | HEART RATE: 86 BPM | HEIGHT: 58 IN | WEIGHT: 148 LBS | TEMPERATURE: 97.7 F | OXYGEN SATURATION: 95 % | RESPIRATION RATE: 18 BRPM | BODY MASS INDEX: 31.07 KG/M2

## 2025-01-29 DIAGNOSIS — J06.9 VIRAL URI WITH COUGH: Primary | ICD-10-CM

## 2025-01-29 PROCEDURE — 1123F ACP DISCUSS/DSCN MKR DOCD: CPT | Performed by: FAMILY MEDICINE

## 2025-01-29 PROCEDURE — 1159F MED LIST DOCD IN RCRD: CPT | Performed by: FAMILY MEDICINE

## 2025-01-29 PROCEDURE — 3075F SYST BP GE 130 - 139MM HG: CPT | Performed by: FAMILY MEDICINE

## 2025-01-29 PROCEDURE — 3079F DIAST BP 80-89 MM HG: CPT | Performed by: FAMILY MEDICINE

## 2025-01-29 PROCEDURE — 99213 OFFICE O/P EST LOW 20 MIN: CPT | Performed by: FAMILY MEDICINE

## 2025-01-29 RX ORDER — AZITHROMYCIN 250 MG/1
TABLET, FILM COATED ORAL
Qty: 6 TABLET | Refills: 0 | Status: SHIPPED | OUTPATIENT
Start: 2025-01-29

## 2025-01-29 RX ORDER — BENZONATATE 100 MG/1
100 CAPSULE ORAL 3 TIMES DAILY PRN
Qty: 30 CAPSULE | Refills: 0 | Status: SHIPPED | OUTPATIENT
Start: 2025-01-29 | End: 2025-02-08

## 2025-01-29 RX ORDER — PREDNISONE 10 MG/1
30 TABLET ORAL DAILY
Qty: 9 TABLET | Refills: 0 | Status: SHIPPED | OUTPATIENT
Start: 2025-01-29 | End: 2025-02-01

## 2025-01-29 ASSESSMENT — ENCOUNTER SYMPTOMS: COUGH: 1

## 2025-01-29 ASSESSMENT — PATIENT HEALTH QUESTIONNAIRE - PHQ9
1. LITTLE INTEREST OR PLEASURE IN DOING THINGS: NOT AT ALL
SUM OF ALL RESPONSES TO PHQ9 QUESTIONS 1 & 2: 0
2. FEELING DOWN, DEPRESSED OR HOPELESS: NOT AT ALL
SUM OF ALL RESPONSES TO PHQ QUESTIONS 1-9: 0

## 2025-01-29 NOTE — ASSESSMENT & PLAN NOTE
No alarm sx.  She asks about abx but I favor not.  Will ask to hold for at least five days.  Tessalon for coughing.

## 2025-01-29 NOTE — PROGRESS NOTES
Dariela Sebastian (:  1945) is a 80 y.o. female,Established patient, here for evaluation of the following chief complaint(s):  Cough (Wet cough//2 weeks//chest congestion)         Assessment & Plan  Viral URI with cough    No alarm sx.  She asks about abx but I favor not.  Will ask to hold for at least five days.  Tessalon for coughing.             Fu prn       Subjective   Coughing and chest congestion for about two weeks.  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  Never smoker.  No nasal sx or sore throat.  No fevers.      Cough        Review of Systems   Respiratory:  Positive for cough.    No ap or v/d.  No urine sx.  Also has some mild nasal drainage.         Objective   Physical Examchest cta with good air mvt.    OP was clear.               An electronic signature was used to authenticate this note.    --Fidel Daly MD

## 2025-02-05 DIAGNOSIS — E78.2 MIXED HYPERLIPIDEMIA: ICD-10-CM

## 2025-02-05 RX ORDER — ATORVASTATIN CALCIUM 20 MG/1
20 TABLET, FILM COATED ORAL DAILY
Qty: 90 TABLET | Refills: 1 | OUTPATIENT
Start: 2025-02-05

## 2025-02-09 DIAGNOSIS — I10 ESSENTIAL HYPERTENSION: ICD-10-CM

## 2025-02-10 RX ORDER — CARVEDILOL 25 MG/1
25 TABLET ORAL 2 TIMES DAILY WITH MEALS
Qty: 180 TABLET | Refills: 1 | OUTPATIENT
Start: 2025-02-10

## 2025-02-19 ENCOUNTER — OFFICE VISIT (OUTPATIENT)
Dept: FAMILY MEDICINE CLINIC | Facility: CLINIC | Age: 80
End: 2025-02-19
Payer: MEDICARE

## 2025-02-19 VITALS
SYSTOLIC BLOOD PRESSURE: 130 MMHG | DIASTOLIC BLOOD PRESSURE: 80 MMHG | HEART RATE: 65 BPM | WEIGHT: 148.2 LBS | HEIGHT: 58 IN | BODY MASS INDEX: 31.11 KG/M2 | TEMPERATURE: 97.2 F | OXYGEN SATURATION: 96 % | RESPIRATION RATE: 16 BRPM

## 2025-02-19 DIAGNOSIS — E66.09 CLASS 1 OBESITY DUE TO EXCESS CALORIES WITH SERIOUS COMORBIDITY AND BODY MASS INDEX (BMI) OF 30.0 TO 30.9 IN ADULT: Chronic | ICD-10-CM

## 2025-02-19 DIAGNOSIS — E78.2 MIXED HYPERLIPIDEMIA: Chronic | ICD-10-CM

## 2025-02-19 DIAGNOSIS — E66.811 CLASS 1 OBESITY DUE TO EXCESS CALORIES WITH SERIOUS COMORBIDITY AND BODY MASS INDEX (BMI) OF 30.0 TO 30.9 IN ADULT: Chronic | ICD-10-CM

## 2025-02-19 DIAGNOSIS — E78.2 MIXED HYPERLIPIDEMIA: Primary | Chronic | ICD-10-CM

## 2025-02-19 DIAGNOSIS — I10 ESSENTIAL HYPERTENSION: Chronic | ICD-10-CM

## 2025-02-19 LAB
CHOLEST SERPL-MCNC: 137 MG/DL (ref 0–200)
HDLC SERPL-MCNC: 53 MG/DL (ref 40–60)
HDLC SERPL: 2.6 (ref 0–5)
LDLC SERPL CALC-MCNC: 69 MG/DL (ref 0–100)
TRIGL SERPL-MCNC: 73 MG/DL (ref 0–150)
VLDLC SERPL CALC-MCNC: 15 MG/DL (ref 6–23)

## 2025-02-19 PROCEDURE — 3079F DIAST BP 80-89 MM HG: CPT | Performed by: FAMILY MEDICINE

## 2025-02-19 PROCEDURE — 1159F MED LIST DOCD IN RCRD: CPT | Performed by: FAMILY MEDICINE

## 2025-02-19 PROCEDURE — 1160F RVW MEDS BY RX/DR IN RCRD: CPT | Performed by: FAMILY MEDICINE

## 2025-02-19 PROCEDURE — 99214 OFFICE O/P EST MOD 30 MIN: CPT | Performed by: FAMILY MEDICINE

## 2025-02-19 PROCEDURE — 1123F ACP DISCUSS/DSCN MKR DOCD: CPT | Performed by: FAMILY MEDICINE

## 2025-02-19 PROCEDURE — G2211 COMPLEX E/M VISIT ADD ON: HCPCS | Performed by: FAMILY MEDICINE

## 2025-02-19 PROCEDURE — 3075F SYST BP GE 130 - 139MM HG: CPT | Performed by: FAMILY MEDICINE

## 2025-02-19 RX ORDER — ATORVASTATIN CALCIUM 20 MG/1
20 TABLET, FILM COATED ORAL DAILY
Qty: 90 TABLET | Refills: 1 | Status: SHIPPED | OUTPATIENT
Start: 2025-02-19

## 2025-02-19 RX ORDER — VALSARTAN 320 MG/1
320 TABLET ORAL DAILY
Qty: 90 TABLET | Refills: 1 | Status: SHIPPED | OUTPATIENT
Start: 2025-02-19

## 2025-02-19 RX ORDER — CARVEDILOL 25 MG/1
25 TABLET ORAL 2 TIMES DAILY WITH MEALS
Qty: 180 TABLET | Refills: 1 | Status: SHIPPED | OUTPATIENT
Start: 2025-02-19

## 2025-02-19 SDOH — ECONOMIC STABILITY: FOOD INSECURITY: WITHIN THE PAST 12 MONTHS, YOU WORRIED THAT YOUR FOOD WOULD RUN OUT BEFORE YOU GOT MONEY TO BUY MORE.: NEVER TRUE

## 2025-02-19 SDOH — ECONOMIC STABILITY: FOOD INSECURITY: WITHIN THE PAST 12 MONTHS, THE FOOD YOU BOUGHT JUST DIDN'T LAST AND YOU DIDN'T HAVE MONEY TO GET MORE.: NEVER TRUE

## 2025-02-19 ASSESSMENT — ENCOUNTER SYMPTOMS
ABDOMINAL PAIN: 0
ABDOMINAL DISTENTION: 0
SINUS PAIN: 0
NAUSEA: 0
VOMITING: 0
DIARRHEA: 0
WHEEZING: 0
PHOTOPHOBIA: 0
SHORTNESS OF BREATH: 0
COUGH: 0

## 2025-02-19 ASSESSMENT — PATIENT HEALTH QUESTIONNAIRE - PHQ9
SUM OF ALL RESPONSES TO PHQ9 QUESTIONS 1 & 2: 0
SUM OF ALL RESPONSES TO PHQ QUESTIONS 1-9: 0
2. FEELING DOWN, DEPRESSED OR HOPELESS: NOT AT ALL
1. LITTLE INTEREST OR PLEASURE IN DOING THINGS: NOT AT ALL
SUM OF ALL RESPONSES TO PHQ QUESTIONS 1-9: 0

## 2025-02-19 NOTE — PROGRESS NOTES
Dariela Sebastian (:  1945) is a 80 y.o. female,Established patient, here for evaluation of the following chief complaint(s):  Medication Refill and Labs Only      Assessment & Plan   ASSESSMENT/PLAN:  1. Mixed hyperlipidemia  -     atorvastatin (LIPITOR) 20 MG tablet; Take 1 tablet by mouth daily, Disp-90 tablet, R-1Normal  -     Lipid Panel; Future  2. Essential hypertension  -     carvedilol (COREG) 25 MG tablet; Take 1 tablet by mouth 2 times daily (with meals), Disp-180 tablet, R-1Normal  -     valsartan (DIOVAN) 320 MG tablet; Take 1 tablet by mouth daily, Disp-90 tablet, R-1Normal  3. Class 1 obesity due to excess calories with serious comorbidity and body mass index (BMI) of 30.0 to 30.9 in adult    HLP- well controlled, continue current management, await labs.  HTN- well controlled, continue current management, to schedule another eye appointment.  Obesity- chronic, slightly better, continue current management.  She had CBC and CMP done for Hem/Onc in October- reviewed with patient; needs to do labs again for them in April.    Return in about 6 months (around 2025) for medication refills, fasting labs or prn sooner.         Subjective   SUBJECTIVE/OBJECTIVE:  Medication Refill  Pertinent negatives include no abdominal pain, chest pain, chills, congestion, coughing, fever, headaches, nausea or vomiting.     Patient comes today for her 6 month follow up and medication refills-     HLP- She takes Atorvastatin 20 mg in the morning, breakfast or lunch are the heaviest meals, no change in diet, overall does not think about doing a low fat diet. Her cholesterol labs have been normal. She is fasting today for labs.  Lab Results   Component Value Date    CHOL 133 2024    CHOL 135 2024    CHOL 127 2023     Lab Results   Component Value Date    TRIG 64 2024    TRIG 55 2024    TRIG 63 2023     Lab Results   Component Value Date    HDL 48 2024    HDL 58 2024  No

## 2025-03-12 ENCOUNTER — TELEPHONE (OUTPATIENT)
Dept: ONCOLOGY | Age: 80
End: 2025-03-12

## 2025-04-15 DIAGNOSIS — Z86.711 PERSONAL HISTORY OF PULMONARY EMBOLISM: ICD-10-CM

## 2025-04-15 DIAGNOSIS — Z79.01 LONG TERM (CURRENT) USE OF ANTICOAGULANTS: ICD-10-CM

## 2025-04-15 LAB
ALBUMIN SERPL-MCNC: 3.5 G/DL (ref 3.2–4.6)
ALBUMIN/GLOB SERPL: 0.9 (ref 1–1.9)
ALP SERPL-CCNC: 156 U/L (ref 35–104)
ALT SERPL-CCNC: 24 U/L (ref 8–45)
ANION GAP SERPL CALC-SCNC: 9 MMOL/L (ref 7–16)
AST SERPL-CCNC: 25 U/L (ref 15–37)
BASOPHILS # BLD: 0.04 K/UL (ref 0–0.2)
BASOPHILS NFR BLD: 0.6 % (ref 0–2)
BILIRUB SERPL-MCNC: 0.6 MG/DL (ref 0–1.2)
BUN SERPL-MCNC: 17 MG/DL (ref 8–23)
CALCIUM SERPL-MCNC: 9.2 MG/DL (ref 8.8–10.2)
CHLORIDE SERPL-SCNC: 106 MMOL/L (ref 98–107)
CO2 SERPL-SCNC: 28 MMOL/L (ref 20–29)
CREAT SERPL-MCNC: 0.81 MG/DL (ref 0.6–1.1)
D DIMER PPP FEU-MCNC: <0.27 UG/ML(FEU)
DIFFERENTIAL METHOD BLD: ABNORMAL
EOSINOPHIL # BLD: 0.35 K/UL (ref 0–0.8)
EOSINOPHIL NFR BLD: 4.9 % (ref 0.5–7.8)
ERYTHROCYTE [DISTWIDTH] IN BLOOD BY AUTOMATED COUNT: 15.3 % (ref 11.9–14.6)
GLOBULIN SER CALC-MCNC: 4 G/DL (ref 2.3–3.5)
GLUCOSE SERPL-MCNC: 108 MG/DL (ref 70–99)
HCT VFR BLD AUTO: 40.9 % (ref 35.8–46.3)
HGB BLD-MCNC: 13.4 G/DL (ref 11.7–15.4)
IMM GRANULOCYTES # BLD AUTO: 0.02 K/UL (ref 0–0.5)
IMM GRANULOCYTES NFR BLD AUTO: 0.3 % (ref 0–5)
LYMPHOCYTES # BLD: 2.45 K/UL (ref 0.5–4.6)
LYMPHOCYTES NFR BLD: 34.2 % (ref 13–44)
MCH RBC QN AUTO: 30.2 PG (ref 26.1–32.9)
MCHC RBC AUTO-ENTMCNC: 32.8 G/DL (ref 31.4–35)
MCV RBC AUTO: 92.1 FL (ref 82–102)
MONOCYTES # BLD: 0.64 K/UL (ref 0.1–1.3)
MONOCYTES NFR BLD: 8.9 % (ref 4–12)
NEUTS SEG # BLD: 3.66 K/UL (ref 1.7–8.2)
NEUTS SEG NFR BLD: 51.1 % (ref 43–78)
NRBC # BLD: 0 K/UL (ref 0–0.2)
PLATELET # BLD AUTO: 236 K/UL (ref 150–450)
PMV BLD AUTO: 11.9 FL (ref 9.4–12.3)
POTASSIUM SERPL-SCNC: 3.8 MMOL/L (ref 3.5–5.1)
PROT SERPL-MCNC: 7.5 G/DL (ref 6.3–8.2)
RBC # BLD AUTO: 4.44 M/UL (ref 4.05–5.2)
SODIUM SERPL-SCNC: 143 MMOL/L (ref 136–145)
WBC # BLD AUTO: 7.2 K/UL (ref 4.3–11.1)

## 2025-04-18 ENCOUNTER — OFFICE VISIT (OUTPATIENT)
Dept: ONCOLOGY | Age: 80
End: 2025-04-18

## 2025-04-18 VITALS
WEIGHT: 149.2 LBS | TEMPERATURE: 98.1 F | HEIGHT: 58 IN | DIASTOLIC BLOOD PRESSURE: 81 MMHG | SYSTOLIC BLOOD PRESSURE: 198 MMHG | RESPIRATION RATE: 18 BRPM | BODY MASS INDEX: 31.32 KG/M2 | OXYGEN SATURATION: 99 % | HEART RATE: 59 BPM

## 2025-04-18 DIAGNOSIS — Z79.01 LONG TERM (CURRENT) USE OF ANTICOAGULANTS: Primary | ICD-10-CM

## 2025-04-18 DIAGNOSIS — Z86.711 PERSONAL HISTORY OF PULMONARY EMBOLISM: ICD-10-CM

## 2025-05-19 ENCOUNTER — OFFICE VISIT (OUTPATIENT)
Dept: FAMILY MEDICINE CLINIC | Facility: CLINIC | Age: 80
End: 2025-05-19
Payer: MEDICARE

## 2025-05-19 VITALS
BODY MASS INDEX: 30.88 KG/M2 | WEIGHT: 147.1 LBS | DIASTOLIC BLOOD PRESSURE: 76 MMHG | OXYGEN SATURATION: 98 % | SYSTOLIC BLOOD PRESSURE: 130 MMHG | RESPIRATION RATE: 16 BRPM | HEART RATE: 68 BPM | TEMPERATURE: 96.9 F | HEIGHT: 58 IN

## 2025-05-19 DIAGNOSIS — Z00.00 MEDICARE ANNUAL WELLNESS VISIT, SUBSEQUENT: Primary | ICD-10-CM

## 2025-05-19 PROCEDURE — 3075F SYST BP GE 130 - 139MM HG: CPT | Performed by: FAMILY MEDICINE

## 2025-05-19 PROCEDURE — 1123F ACP DISCUSS/DSCN MKR DOCD: CPT | Performed by: FAMILY MEDICINE

## 2025-05-19 PROCEDURE — 3078F DIAST BP <80 MM HG: CPT | Performed by: FAMILY MEDICINE

## 2025-05-19 PROCEDURE — 1159F MED LIST DOCD IN RCRD: CPT | Performed by: FAMILY MEDICINE

## 2025-05-19 PROCEDURE — G0439 PPPS, SUBSEQ VISIT: HCPCS | Performed by: FAMILY MEDICINE

## 2025-05-19 PROCEDURE — 1160F RVW MEDS BY RX/DR IN RCRD: CPT | Performed by: FAMILY MEDICINE

## 2025-05-19 RX ORDER — ZOSTER VACCINE RECOMBINANT, ADJUVANTED 50 MCG/0.5
KIT INTRAMUSCULAR
Qty: 0.5 ML | Refills: 1 | Status: SHIPPED | OUTPATIENT
Start: 2025-05-19

## 2025-05-19 ASSESSMENT — PATIENT HEALTH QUESTIONNAIRE - PHQ9
SUM OF ALL RESPONSES TO PHQ QUESTIONS 1-9: 0
2. FEELING DOWN, DEPRESSED OR HOPELESS: NOT AT ALL
1. LITTLE INTEREST OR PLEASURE IN DOING THINGS: NOT AT ALL
SUM OF ALL RESPONSES TO PHQ QUESTIONS 1-9: 0

## 2025-05-19 NOTE — ACP (ADVANCE CARE PLANNING)
Date of ACP Conversation: 5/19/25  Persons included in Conversation: Patient  Length of ACP Conversation in minutes: 5 minutes    Authorized Decision Maker (if patient is incapable of making informed decisions): NA          For Patients with Decision Making Capacity:   Patient does not have a Living Will, Advance Directives or Healthcare Power of .      Conversation Outcomes / Follow-Up Plan:   Advised patient to make a Living Will, Advance Directives and a Healthcare Power of .  Also advised patient to bring a copy of the same to their chart - patient understands and agrees.

## 2025-05-19 NOTE — PROGRESS NOTES
pedometer and walk at least 10,000 steps/day     Abnormal BMI (obese):  Body mass index is 30.74 kg/m². (!) Abnormal  Interventions:  1300- 1500  calorie/day diet          Vision Screen:  Do you have difficulty driving, watching TV, or doing any of your daily activities because of your eyesight?: No  Have you had an eye exam within the past year?: (!) No  Interventions:   Patient encouraged to make appointment with their eye specialist    Safety:  Do you have non-slip mats or non-slip surfaces or shower bars or grab bars in your shower or bathtub?: (!) No  Interventions:  She uses a bath tub with a non slip surface, but does not have grab bars     Advanced Directives:  Do you have a Living Will?: (!) No    Intervention:  see ACP note                     Objective   Vitals:    05/19/25 1051   BP: 130/76   Pulse: 68   Resp: 16   Temp: 96.9 °F (36.1 °C)   TempSrc: Temporal   SpO2: 98%   Weight: 66.7 kg (147 lb 1.6 oz)   Height: 1.473 m (4' 10\")      Body mass index is 30.74 kg/m².                    Allergies   Allergen Reactions    Codeine Swelling     Swelling of face and lips     Prior to Visit Medications    Medication Sig Taking? Authorizing Provider   rivaroxaban (XARELTO) 10 MG TABS tablet Take 1 tablet by mouth daily (with breakfast) Yes Karen Galeana, NP-C   atorvastatin (LIPITOR) 20 MG tablet Take 1 tablet by mouth daily Yes Lucy Calderon MD   carvedilol (COREG) 25 MG tablet Take 1 tablet by mouth 2 times daily (with meals) Yes Lucy Calderon MD   valsartan (DIOVAN) 320 MG tablet Take 1 tablet by mouth daily Yes Lucy Calderon MD   triamcinolone (KENALOG) 0.1 % cream Apply to affected area 2 times daily. Yes Lucy Calderon MD       [facility] (Including outside providers/suppliers regularly involved in providing care):   Patient Care Team:  Lucy Calderon MD as PCP - General  Lucy Calderon MD as PCP - Empaneled Provider     Recommendations for Preventive Services Due: see

## 2025-08-18 ENCOUNTER — OFFICE VISIT (OUTPATIENT)
Dept: FAMILY MEDICINE CLINIC | Facility: CLINIC | Age: 80
End: 2025-08-18

## 2025-08-18 VITALS
SYSTOLIC BLOOD PRESSURE: 122 MMHG | OXYGEN SATURATION: 96 % | BODY MASS INDEX: 30.14 KG/M2 | HEART RATE: 61 BPM | RESPIRATION RATE: 17 BRPM | WEIGHT: 143.6 LBS | HEIGHT: 58 IN | TEMPERATURE: 97.3 F | DIASTOLIC BLOOD PRESSURE: 86 MMHG

## 2025-08-18 DIAGNOSIS — I10 ESSENTIAL HYPERTENSION: Chronic | ICD-10-CM

## 2025-08-18 DIAGNOSIS — E78.2 MIXED HYPERLIPIDEMIA: Primary | Chronic | ICD-10-CM

## 2025-08-18 DIAGNOSIS — E66.811 CLASS 1 OBESITY DUE TO EXCESS CALORIES WITH SERIOUS COMORBIDITY AND BODY MASS INDEX (BMI) OF 30.0 TO 30.9 IN ADULT: ICD-10-CM

## 2025-08-18 DIAGNOSIS — E66.09 CLASS 1 OBESITY DUE TO EXCESS CALORIES WITH SERIOUS COMORBIDITY AND BODY MASS INDEX (BMI) OF 30.0 TO 30.9 IN ADULT: ICD-10-CM

## 2025-08-18 RX ORDER — ATORVASTATIN CALCIUM 20 MG/1
20 TABLET, FILM COATED ORAL DAILY
Qty: 90 TABLET | Refills: 1 | Status: SHIPPED | OUTPATIENT
Start: 2025-08-18

## 2025-08-18 RX ORDER — CARVEDILOL 25 MG/1
25 TABLET ORAL 2 TIMES DAILY WITH MEALS
Qty: 180 TABLET | Refills: 1 | Status: SHIPPED | OUTPATIENT
Start: 2025-08-18

## 2025-08-18 RX ORDER — VALSARTAN 320 MG/1
320 TABLET ORAL DAILY
Qty: 90 TABLET | Refills: 1 | Status: SHIPPED | OUTPATIENT
Start: 2025-08-18

## 2025-08-18 SDOH — ECONOMIC STABILITY: FOOD INSECURITY: WITHIN THE PAST 12 MONTHS, YOU WORRIED THAT YOUR FOOD WOULD RUN OUT BEFORE YOU GOT MONEY TO BUY MORE.: NEVER TRUE

## 2025-08-18 SDOH — ECONOMIC STABILITY: FOOD INSECURITY: WITHIN THE PAST 12 MONTHS, THE FOOD YOU BOUGHT JUST DIDN'T LAST AND YOU DIDN'T HAVE MONEY TO GET MORE.: NEVER TRUE

## 2025-08-18 ASSESSMENT — PATIENT HEALTH QUESTIONNAIRE - PHQ9
2. FEELING DOWN, DEPRESSED OR HOPELESS: NOT AT ALL
SUM OF ALL RESPONSES TO PHQ QUESTIONS 1-9: 0
SUM OF ALL RESPONSES TO PHQ QUESTIONS 1-9: 0
1. LITTLE INTEREST OR PLEASURE IN DOING THINGS: NOT AT ALL
SUM OF ALL RESPONSES TO PHQ QUESTIONS 1-9: 0
SUM OF ALL RESPONSES TO PHQ QUESTIONS 1-9: 0